# Patient Record
Sex: FEMALE | Race: AMERICAN INDIAN OR ALASKA NATIVE | NOT HISPANIC OR LATINO | Employment: OTHER | URBAN - METROPOLITAN AREA
[De-identification: names, ages, dates, MRNs, and addresses within clinical notes are randomized per-mention and may not be internally consistent; named-entity substitution may affect disease eponyms.]

---

## 2018-07-20 ENCOUNTER — OFFICE VISIT (OUTPATIENT)
Dept: ENDOCRINOLOGY | Facility: MEDICAL CENTER | Age: 78
End: 2018-07-20
Payer: MEDICARE

## 2018-07-20 VITALS
OXYGEN SATURATION: 91 % | HEIGHT: 66 IN | SYSTOLIC BLOOD PRESSURE: 153 MMHG | DIASTOLIC BLOOD PRESSURE: 70 MMHG | HEART RATE: 92 BPM | WEIGHT: 190 LBS | BODY MASS INDEX: 30.53 KG/M2

## 2018-07-20 DIAGNOSIS — E11.00 UNCONTROLLED TYPE 2 DIABETES MELLITUS WITH HYPEROSMOLARITY WITHOUT COMA, WITHOUT LONG-TERM CURRENT USE OF INSULIN (HCC): ICD-10-CM

## 2018-07-20 PROCEDURE — 99203 OFFICE O/P NEW LOW 30 MIN: CPT | Performed by: PHYSICIAN ASSISTANT

## 2018-07-20 RX ORDER — EMPAGLIFLOZIN 25 MG/1
1 TABLET, FILM COATED ORAL DAILY
Qty: 30 TAB | Refills: 11 | Status: SHIPPED | OUTPATIENT
Start: 2018-07-20 | End: 2021-10-21

## 2018-07-20 NOTE — PROGRESS NOTES
New Patient Consult Note  Referred by: NORMAN Johnson    Reason for consult: Diabetes Management Type 2    HPI:  Sarah Marie is a 78 y.o. old patient who is seeing us today for diabetes care.  This is a pleasant patient with diabetes and I appreciate the opportunity to participate in the care of this patient.  This is a new patient with me today.    Labs of 7/20/18     BG Diary:7/20/2018  In the AM:     Has been Diabetic since  Has a Glucagon pen at home: no    Right  stroke  Blind in the left eye    1. Uncontrolled type 2 diabetes mellitus with hyperosmolarity without coma, without long-term current use of insulin (HCC)  This is a new patient with me on 7/20/18  She is on   1.  Jardiance 25mg one a day      ROS:   Constitutional: No change in weight , No fatigue, No night sweats.  HEENT: No Headache.  Eyes:  No blurred vision, No visual changes.  Cardiac: No chest pain, No palpitations.  Resp: No shortness of breath, No cough,   Gastro: No nausea or vomiting, No diarrhea.  Neuro: Denies numbness or tinging in bilateral feet or hands, and no loss of sensation.  Endo: No heat or cold intolerance.  : No polyuria, No polydipsia, No chronic UTI's.  Lower extremities: No lower leg edema bilateral.  All other systems were reviewed and were negative.    Past Medical History:  Patient Active Problem List    Diagnosis Date Noted   • Diabetes mellitus type II, uncontrolled (HCC) 07/20/2018   • Chest pain 07/12/2016   • Acquired deflected nasal septum 12/10/2015       Past Surgical History:  Past Surgical History:   Procedure Laterality Date   • SEPTOPLASTY  12/10/2015    Procedure: SEPTOPLASTY;  Surgeon: Navjot Calhoun M.D.;  Location: SURGERY SAME DAY [facility];  Service:    • NASAL ENDOSCOPY Bilateral 12/10/2015    Procedure: NASAL ENDOSCOPY FOR CONTROL NASAL HEMORRHAGE;  Surgeon: Navjot Calhoun M.D.;  Location: SURGERY SAME DAY [facility];  Service:    • OTHER ORTHOPEDIC SURGERY  1997    BACK SURGERY   •  GYN SURGERY      1988 hysterectomy   • OTHER      luke iol (cataract)   • OTHER      gall stones removed   • OTHER ABDOMINAL SURGERY         Allergies:  Clindamycin; Codeine; Diclofenac; Iodine; Metformin; Omnicef [cefdinir]; Oxycodone; and Pcn [penicillins]    Social History:  Social History     Social History   • Marital status:      Spouse name: N/A   • Number of children: N/A   • Years of education: N/A     Occupational History   • Not on file.     Social History Main Topics   • Smoking status: Former Smoker   • Smokeless tobacco: Never Used   • Alcohol use No   • Drug use: No   • Sexual activity: Not on file     Other Topics Concern   • Not on file     Social History Narrative   • No narrative on file       Family History:  History reviewed. No pertinent family history.    Medications:    Current Outpatient Prescriptions:   •  Empagliflozin (JARDIANCE) 25 MG Tab, Take  by mouth., Disp: , Rfl:   •  JARDIANCE 25 MG Tab, Take 1 tablet by mouth every day., Disp: 30 Tab, Rfl: 11  •  cetirizine (ZYRTEC) 10 MG Tab, Take 10 mg by mouth every bedtime., Disp: , Rfl:   •  estrogens conjugated, synthetic A, (CENESTIN) 1.25 MG tablet, Take 1.25 mg by mouth every day., Disp: , Rfl:   •  lovastatin (MEVACOR) 40 MG tablet, Take 40 mg by mouth every evening., Disp: , Rfl:   •  Omega 3 1000 MG Cap, Take 1,000 mg by mouth every day., Disp: , Rfl:   •  omeprazole (PRILOSEC) 40 MG delayed-release capsule, Take 40 mg by mouth every day., Disp: , Rfl:   •  Cholecalciferol (VITAMIN D3) 2000 UNIT Cap, Take 2,000 Units by mouth every day., Disp: , Rfl:   •  dorzolamide (TRUSOPT) 2 % Solution, Place 1 Drop in both eyes 2 Times a Day., Disp: , Rfl:   •  latanoprost (XALATAN) 0.005 % Solution, Place 1 Drop in both eyes every evening., Disp: , Rfl:   •  carvedilol (COREG) 6.25 MG TABS, Take 1 Each by mouth every day at 6 PM., Disp: , Rfl:   •  lisinopril (PRINIVIL, ZESTRIL) 40 MG tablet, Take 1 Tab by mouth every day., Disp: , Rfl:  "      Physical Examination:   Vital signs: /70   Pulse 92   Ht 1.676 m (5' 6\")   Wt 86.2 kg (190 lb)   SpO2 91%   BMI 30.67 kg/m²   General: No distress, cooperative, well dressed and well nourished.   Eyes: No scleral icterus or discharge, No hyposphagma  ENMT: Normal on external inspection of nose, lips, No nasal drainage   Neck: No abnormal masses on inspection  Resp: Normal effort, Bilateral clear to auscultation, No wheezing, No rales  CVS: Regular rate and rhythm, S1 S2 normal, No murmur. No gallop  Extremities: No edema bilateral extremities  Neuro: Alert and oriented  Skin: No rash, No Ulcers  Psych: Normal mood and affect      Assessment and Plan:    1. Uncontrolled type 2 diabetes mellitus with hyperosmolarity without coma, without long-term current use of insulin (HCC)     She is on   1.  Jardiance 25mg one a day      Return in about 1 year (around 7/20/2019).    Blood glucose log: Check BG in the morning when wake up, before lunch or dinner and before bed.  So three times a day.  Always bring BG diary to the next office visit.     Thank you kindly for allowing me to participate in the diabetes care plan for this patient.    Yousif Anaya PA-C, BC-ADM  Board Certified - [employer] Diabetes [facility]  07/20/18    CC:   TALISHA Johnson.    "

## 2018-08-01 ENCOUNTER — HOSPITAL ENCOUNTER (EMERGENCY)
Facility: MEDICAL CENTER | Age: 78
End: 2018-08-01
Attending: EMERGENCY MEDICINE
Payer: MEDICARE

## 2018-08-01 VITALS
BODY MASS INDEX: 30.53 KG/M2 | HEIGHT: 66 IN | SYSTOLIC BLOOD PRESSURE: 157 MMHG | HEART RATE: 86 BPM | OXYGEN SATURATION: 98 % | TEMPERATURE: 97.9 F | DIASTOLIC BLOOD PRESSURE: 84 MMHG | WEIGHT: 190 LBS | RESPIRATION RATE: 18 BRPM

## 2018-08-01 DIAGNOSIS — T50.905A MEDICATION REACTION, INITIAL ENCOUNTER: ICD-10-CM

## 2018-08-01 DIAGNOSIS — R11.2 NON-INTRACTABLE VOMITING WITH NAUSEA, UNSPECIFIED VOMITING TYPE: ICD-10-CM

## 2018-08-01 PROCEDURE — 99284 EMERGENCY DEPT VISIT MOD MDM: CPT

## 2018-08-01 PROCEDURE — 700111 HCHG RX REV CODE 636 W/ 250 OVERRIDE (IP): Performed by: EMERGENCY MEDICINE

## 2018-08-01 PROCEDURE — 700102 HCHG RX REV CODE 250 W/ 637 OVERRIDE(OP): Performed by: EMERGENCY MEDICINE

## 2018-08-01 PROCEDURE — A9270 NON-COVERED ITEM OR SERVICE: HCPCS | Performed by: EMERGENCY MEDICINE

## 2018-08-01 RX ORDER — ONDANSETRON 4 MG/1
4 TABLET, ORALLY DISINTEGRATING ORAL ONCE
Status: COMPLETED | OUTPATIENT
Start: 2018-08-01 | End: 2018-08-01

## 2018-08-01 RX ORDER — ONDANSETRON 4 MG/1
4 TABLET, ORALLY DISINTEGRATING ORAL EVERY 8 HOURS PRN
Qty: 15 TAB | Refills: 0 | Status: SHIPPED | OUTPATIENT
Start: 2018-08-01 | End: 2021-10-21

## 2018-08-01 RX ADMIN — ONDANSETRON 4 MG: 4 TABLET, ORALLY DISINTEGRATING ORAL at 17:57

## 2018-08-01 RX ADMIN — LIDOCAINE HYDROCHLORIDE 30 ML: 20 SOLUTION OROPHARYNGEAL at 17:57

## 2018-08-01 ASSESSMENT — PAIN SCALES - GENERAL
PAINLEVEL_OUTOF10: 8
PAINLEVEL_OUTOF10: 0

## 2018-08-01 NOTE — ED TRIAGE NOTES
Patient reports nausea and 1 episode of vomiting after breakfast. Alert, oriented. Patient states she took her norco without eating anything this AM. Generalized abdominal pain.

## 2018-08-02 ENCOUNTER — PATIENT OUTREACH (OUTPATIENT)
Dept: HEALTH INFORMATION MANAGEMENT | Facility: OTHER | Age: 78
End: 2018-08-02

## 2018-08-02 NOTE — ED PROVIDER NOTES
ED Provider Note    Scribed for Sly Paz M.D. by Talon Burgess. 8/1/2018  5:35 PM    Primary care provider: NORMAN Johnson  Means of arrival: walk in  History obtained from: Patient  History limited by: None    CHIEF COMPLAINT  Chief Complaint   Patient presents with   • Nausea       HPI  Sarah Marie is a 78 y.o. female who presents to the Emergency Department complaining of gradually worsening nausea starting this morning. Daughter reports that the patient is partially blind in her right eye. She states that the patient filled a prescription yesterday for 30 tabs of hydrocodone, but her daughter noted that there was only 20 tabs in the bottle today. Patient has a history of diabetes. She denies vomiting, weakness, numbness, leg swelling, history of ulcer.     REVIEW OF SYSTEMS  Pertinent negatives include no vomiting, weakness, numbness, leg swelling. As above, all other systems reviewed and are negative.   See HPI for further details.   C.    PAST MEDICAL HISTORY   has a past medical history of Angina; Arthritis; Backpain; CATARACT; Diabetes; Diverticulosis; Hemorrhagic disorder (HCC); High cholesterol; Hypertension; Stroke (HCC); and Unspecified urinary incontinence.    SURGICAL HISTORY   has a past surgical history that includes other abdominal surgery; gyn surgery; other; other; other orthopedic surgery (1997); septoplasty (12/10/2015); and nasal endoscopy (Bilateral, 12/10/2015).    SOCIAL HISTORY  Social History   Substance Use Topics   • Smoking status: Former Smoker   • Smokeless tobacco: Never Used   • Alcohol use No      History   Drug Use No       FAMILY HISTORY  None noted    CURRENT MEDICATIONS  Current Outpatient Prescriptions:   •  Empagliflozin (JARDIANCE) 25 MG Tab, Take  by mouth., Disp: , Rfl:   •  JARDIANCE 25 MG Tab, Take 1 tablet by mouth every day., Disp: 30 Tab, Rfl: 11  •  cetirizine (ZYRTEC) 10 MG Tab, Take 10 mg by mouth every bedtime., Disp: , Rfl:   •  estrogens  "conjugated, synthetic A, (CENESTIN) 1.25 MG tablet, Take 1.25 mg by mouth every day., Disp: , Rfl:   •  lovastatin (MEVACOR) 40 MG tablet, Take 40 mg by mouth every evening., Disp: , Rfl:   •  Omega 3 1000 MG Cap, Take 1,000 mg by mouth every day., Disp: , Rfl:   •  omeprazole (PRILOSEC) 40 MG delayed-release capsule, Take 40 mg by mouth every day., Disp: , Rfl:   •  Cholecalciferol (VITAMIN D3) 2000 UNIT Cap, Take 2,000 Units by mouth every day., Disp: , Rfl:   •  dorzolamide (TRUSOPT) 2 % Solution, Place 1 Drop in both eyes 2 Times a Day., Disp: , Rfl:   •  latanoprost (XALATAN) 0.005 % Solution, Place 1 Drop in both eyes every evening., Disp: , Rfl:   •  carvedilol (COREG) 6.25 MG TABS, Take 1 Each by mouth every day at 6 PM., Disp: , Rfl:   •  lisinopril (PRINIVIL, ZESTRIL) 40 MG tablet, Take 1 Tab by mouth every day., Disp: , Rfl:     ALLERGIES  Allergies   Allergen Reactions   • Clindamycin Rash         • Codeine Vomiting   • Diclofenac      Gi problems     • Iodine Unspecified     Skin irritation     • Metformin      dairrhea   • Omnicef [Cefdinir] Rash     rash   • Oxycodone Vomiting   • Pcn [Penicillins] Rash     As  A child         PHYSICAL EXAM  VITAL SIGNS: BP (!) 166/82   Pulse 88   Temp 36.6 °C (97.9 °F)   Resp 18   Ht 1.676 m (5' 6\")   Wt 86.2 kg (190 lb)   SpO2 96%   BMI 30.67 kg/m²     Constitutional: Well developed, Well nourished, No acute distress, Non-toxic appearance.   HENT: Normocephalic, Atraumatic, Bilateral external ears normal, Oropharynx is clear mucous membranes are moist. No oral exudates or nasal discharge.   Eyes: Pupils are equal round and reactive, EOMI, Conjunctiva normal, No discharge.   Neck: Normal range of motion, No tenderness, Supple, No stridor. No meningismus.  Lymphatic: No lymphadenopathy noted.   Cardiovascular: Regular rate and rhythm without murmur rub or gallop.  Thorax & Lungs: Clear breath sounds bilaterally without wheezes, rhonchi or rales. There is no " chest wall tenderness.   Abdomen: Soft non-tender non-distended. There is no rebound or guarding. No organomegaly is appreciated. Bowel sounds are normal.  Skin: Normal without rash.   Back: No CVA or spinal tenderness.   Extremities: Intact distal pulses, No edema, No tenderness, No cyanosis, No clubbing. Capillary refill is less than 2 seconds.  Musculoskeletal: Good range of motion in all major joints. No tenderness to palpation or major deformities noted.   Neurologic: Alert & oriented x 3, Normal motor function, Normal sensory function, No focal deficits noted. Reflexes are normal.  Psychiatric: Affect normal, Judgment normal, Mood normal. There is no suicidal ideation or patient reported hallucinations.     DIAGNOSTIC STUDIES / PROCEDURES    COURSE & MEDICAL DECISION MAKING  Nursing notes, VS, PMSFHx reviewed in chart.    5:35 PM Patient seen and examined at bedside. She has good baseline mentation at this time. She will well outside of the effective dose range at the reported last dose time 10 hours ago. Discussed treatement with anti nausea medication and reevaluation for discharge. Patient and her daughter verbalizes understanding and agreement to this plan of care. Patient was treated with Zofran 4 mg, GI cocktail 30 ml for her symptoms.      I doubt the patient needs any imaging or lab work at this time.  She has had a medication reaction to hydrocodone and this will be listed as an allergy in the future.    5:56 PM - Recheck: Patient re-evaluated at beside. Patient reports feeling greatly improved with interventions. Discussed patient's condition and treatment plan. Patient will be discharged with instructions and provided with strict return precautions. She will be prescribed Zofran for discharge. Advised to follow up with her primary. Instructed to return to Emergency Department immediately if any new or worsening symptoms, specifically if she develops vomiting, hematemesis.     Discharge vitals: BP  "(!) 166/82   Pulse 88   Temp 36.6 °C (97.9 °F)   Resp 18   Ht 1.676 m (5' 6\")   Wt 86.2 kg (190 lb)   SpO2 96%   BMI 30.67 kg/m²     Patient has had high blood pressure while in the emergency department, felt likely secondary to medical condition. Counseled patient to monitor blood pressure at home and follow up with primary care physician.    The patient will return for new or worsening symptoms and is stable at the time of discharge.    DISPOSITION:  Patient will be discharged home in stable condition.    FINAL IMPRESSION  1. Non-intractable vomiting with nausea, unspecified vomiting type    2. Medication reaction, initial encounter          Talon CHEATHAM (Scribe), am scribing for, and in the presence of, Sly Paz M.D..    Electronically signed by: Talon Burgess (Scribe), 8/1/2018    ISly M.D. personally performed the services described in this documentation, as scribed by Talon Burgess in my presence, and it is both accurate and complete.    The note accurately reflects work and decisions made by me.  Sly Paz  8/1/2018  6:13 PM      "

## 2018-08-02 NOTE — ED NOTES
Patient provided discharge instructions, received 1 rx. Instructed to follow up with PCP. Verbalized understanding, no questions at this time. Safe for discharge, ambulates out of ED.

## 2018-08-02 NOTE — DISCHARGE INSTRUCTIONS
Polypharmacy Problems  I think you had a bad reaction to hydrocodone and I would list this as an allergy in the future  Polypharmacy problems mean that certain medicines can cause problems when you take them together. Some medicine problems can be life-threatening. Your doctors need to know about all the medicines you take. This includes vitamins, herbs, eyedrops, over-the-counter medicines, prescribed medicines, and creams.  HOME CARE  · Pick one doctor to be in charge of your medicines. Tell this doctor about all the medicines you take, even if they are prescribed by another doctor.  · Buy all your medicines at the same pharmacy. The pharmacy keeps track of your medicines. They can tell your doctor if there are possible problems with your medicines.  · Read the instructions you get with your medicines.  · Keep a list of all your medicines and how much you need to take (doses) with you.  · Use a system to keep track of your medicines. You can use a pillbox to sort your medicines by the day and time you need to take them.  · Keep a list of your medical problems with you.  · Ask your doctor or pharmacy if you have any questions about your medicines.  GET HELP RIGHT AWAY IF:   · You have any problems that may be caused by your medicines.  · You have chest pain.  · You are short of breath.  · You have a pulse that is not normal.  · You pass out (faint).  · You are shaking (tremors).  · You feel weak or tired (lethargic).  · You have a rash or puffiness (swelling) in any part of your body.  · You have more bleeding, or bleeding from the butt (rectum) or vagina.  · You bruise easily.  · You have belly (abdominal) pain.  · You feel sick to your stomach (nauseous).  · You throw up (vomit).  MAKE SURE YOU:  · Understand these instructions.  · Will watch your condition.  · Will get help right away if you are not doing well or get worse.     This information is not intended to replace advice given to you by your health care  provider. Make sure you discuss any questions you have with your health care provider.     Document Released: 03/14/2011 Document Revised: 03/11/2013 Document Reviewed: 09/06/2012  Elsevier Interactive Patient Education ©2016 [employer]

## 2018-10-19 ENCOUNTER — HOSPITAL ENCOUNTER (OUTPATIENT)
Dept: RADIOLOGY | Facility: MEDICAL CENTER | Age: 78
End: 2018-10-19
Attending: NURSE PRACTITIONER
Payer: MEDICARE

## 2018-10-19 DIAGNOSIS — M81.0 AGE-RELATED OSTEOPOROSIS WITHOUT CURRENT PATHOLOGICAL FRACTURE: ICD-10-CM

## 2018-10-19 PROCEDURE — 77080 DXA BONE DENSITY AXIAL: CPT

## 2019-07-17 ENCOUNTER — HOSPITAL ENCOUNTER (EMERGENCY)
Facility: MEDICAL CENTER | Age: 79
End: 2019-07-17
Payer: MEDICARE

## 2019-07-17 VITALS
BODY MASS INDEX: 30.05 KG/M2 | HEART RATE: 75 BPM | TEMPERATURE: 96.6 F | SYSTOLIC BLOOD PRESSURE: 137 MMHG | RESPIRATION RATE: 16 BRPM | DIASTOLIC BLOOD PRESSURE: 73 MMHG | OXYGEN SATURATION: 93 % | HEIGHT: 66 IN | WEIGHT: 187 LBS

## 2019-07-17 LAB
ALBUMIN SERPL BCP-MCNC: 4 G/DL (ref 3.2–4.9)
ALBUMIN/GLOB SERPL: 1.3 G/DL
ALP SERPL-CCNC: 72 U/L (ref 30–99)
ALT SERPL-CCNC: 9 U/L (ref 2–50)
ANION GAP SERPL CALC-SCNC: 7 MMOL/L (ref 0–11.9)
AST SERPL-CCNC: 13 U/L (ref 12–45)
BASOPHILS # BLD AUTO: 0.9 % (ref 0–1.8)
BASOPHILS # BLD: 0.06 K/UL (ref 0–0.12)
BILIRUB SERPL-MCNC: 0.3 MG/DL (ref 0.1–1.5)
BUN SERPL-MCNC: 10 MG/DL (ref 8–22)
CALCIUM SERPL-MCNC: 9.1 MG/DL (ref 8.5–10.5)
CHLORIDE SERPL-SCNC: 101 MMOL/L (ref 96–112)
CO2 SERPL-SCNC: 27 MMOL/L (ref 20–33)
CREAT SERPL-MCNC: 0.63 MG/DL (ref 0.5–1.4)
EKG IMPRESSION: NORMAL
EOSINOPHIL # BLD AUTO: 0.17 K/UL (ref 0–0.51)
EOSINOPHIL NFR BLD: 2.7 % (ref 0–6.9)
ERYTHROCYTE [DISTWIDTH] IN BLOOD BY AUTOMATED COUNT: 44.2 FL (ref 35.9–50)
GLOBULIN SER CALC-MCNC: 3.1 G/DL (ref 1.9–3.5)
GLUCOSE SERPL-MCNC: 116 MG/DL (ref 65–99)
HCT VFR BLD AUTO: 46.4 % (ref 37–47)
HGB BLD-MCNC: 14.8 G/DL (ref 12–16)
IMM GRANULOCYTES # BLD AUTO: 0.01 K/UL (ref 0–0.11)
IMM GRANULOCYTES NFR BLD AUTO: 0.2 % (ref 0–0.9)
LYMPHOCYTES # BLD AUTO: 2.83 K/UL (ref 1–4.8)
LYMPHOCYTES NFR BLD: 44.5 % (ref 22–41)
MCH RBC QN AUTO: 29.5 PG (ref 27–33)
MCHC RBC AUTO-ENTMCNC: 31.9 G/DL (ref 33.6–35)
MCV RBC AUTO: 92.4 FL (ref 81.4–97.8)
MONOCYTES # BLD AUTO: 0.51 K/UL (ref 0–0.85)
MONOCYTES NFR BLD AUTO: 8 % (ref 0–13.4)
NEUTROPHILS # BLD AUTO: 2.78 K/UL (ref 2–7.15)
NEUTROPHILS NFR BLD: 43.7 % (ref 44–72)
NRBC # BLD AUTO: 0 K/UL
NRBC BLD-RTO: 0 /100 WBC
PLATELET # BLD AUTO: 227 K/UL (ref 164–446)
PMV BLD AUTO: 9.8 FL (ref 9–12.9)
POTASSIUM SERPL-SCNC: 4.1 MMOL/L (ref 3.6–5.5)
PROT SERPL-MCNC: 7.1 G/DL (ref 6–8.2)
RBC # BLD AUTO: 5.02 M/UL (ref 4.2–5.4)
SODIUM SERPL-SCNC: 135 MMOL/L (ref 135–145)
TROPONIN T SERPL-MCNC: 7 NG/L (ref 6–19)
WBC # BLD AUTO: 6.4 K/UL (ref 4.8–10.8)

## 2019-07-17 PROCEDURE — 93005 ELECTROCARDIOGRAM TRACING: CPT

## 2019-07-17 PROCEDURE — 85025 COMPLETE CBC W/AUTO DIFF WBC: CPT

## 2019-07-17 PROCEDURE — 302449 STATCHG TRIAGE ONLY (STATISTIC)

## 2019-07-17 PROCEDURE — 80053 COMPREHEN METABOLIC PANEL: CPT

## 2019-07-17 PROCEDURE — 84484 ASSAY OF TROPONIN QUANT: CPT

## 2019-07-17 NOTE — ED TRIAGE NOTES
"[facility], pt was being seen for nausea today and when they took her BP they had a systolic in the low 100's, pt became worried that her BP was too low. PT requested to be transported to the ER. PT arrives and reports that she has nausea that started this AM, while triaging pt she then reported \"chest pressure, like a hand is pushing on me\".  EKG called for triage protocol ordered   Chief Complaint   Patient presents with   • Nausea     x 1 day   • Chest Pressure     /73   Pulse 75   Temp 35.9 °C (96.6 °F) (Temporal)   Resp 16   Ht 1.676 m (5' 6\")   Wt 84.8 kg (187 lb)   SpO2 93%     "

## 2019-09-23 NOTE — TELEPHONE ENCOUNTER
**Last office visit 7/20/2018**    Was the patient seen in the last year in this department? No     Does patient have an active prescription for medications requested? Yes    Received Request Via: Pharmacy    Day supply: 30

## 2020-03-06 ENCOUNTER — HOSPITAL ENCOUNTER (OUTPATIENT)
Dept: RADIOLOGY | Facility: MEDICAL CENTER | Age: 80
End: 2020-03-06
Attending: NURSE PRACTITIONER
Payer: MEDICARE

## 2020-03-06 DIAGNOSIS — G89.29 CHRONIC RIGHT-SIDED LOW BACK PAIN WITHOUT SCIATICA: ICD-10-CM

## 2020-03-06 DIAGNOSIS — M54.50 CHRONIC RIGHT-SIDED LOW BACK PAIN WITHOUT SCIATICA: ICD-10-CM

## 2020-03-06 PROCEDURE — 72148 MRI LUMBAR SPINE W/O DYE: CPT

## 2021-01-13 DIAGNOSIS — Z23 NEED FOR VACCINATION: ICD-10-CM

## 2021-10-18 ENCOUNTER — TELEPHONE (OUTPATIENT)
Dept: CARDIOLOGY | Facility: MEDICAL CENTER | Age: 81
End: 2021-10-18

## 2021-10-18 NOTE — TELEPHONE ENCOUNTER
Called patient in regards to obtaining records for NP appointment with [facility]. Patient's daughter stated she believes pt had seen a cardiologist once in the 90's but no cardiologist since then. Pt's daughter stated pt has had cardiac imaging and testing done at [facility]. Requested records from [facility] at fax # [phone]. Fax confirmation received. Records pending. Confirmed all other recent notes, labs, and cardiac imaging are in Epic. Confirmed with patient appointment date, time, and location.

## 2021-10-21 ENCOUNTER — OFFICE VISIT (OUTPATIENT)
Dept: CARDIOLOGY | Facility: MEDICAL CENTER | Age: 81
End: 2021-10-21

## 2021-10-21 VITALS
RESPIRATION RATE: 12 BRPM | DIASTOLIC BLOOD PRESSURE: 68 MMHG | OXYGEN SATURATION: 98 % | WEIGHT: 157 LBS | SYSTOLIC BLOOD PRESSURE: 126 MMHG | HEIGHT: 66 IN | BODY MASS INDEX: 25.23 KG/M2 | HEART RATE: 100 BPM

## 2021-10-21 DIAGNOSIS — E11.65 UNCONTROLLED TYPE 2 DIABETES MELLITUS WITH HYPERGLYCEMIA (HCC): ICD-10-CM

## 2021-10-21 DIAGNOSIS — R07.9 CHEST PAIN, UNSPECIFIED TYPE: ICD-10-CM

## 2021-10-21 DIAGNOSIS — E78.49 OTHER HYPERLIPIDEMIA: ICD-10-CM

## 2021-10-21 DIAGNOSIS — I10 PRIMARY HYPERTENSION: ICD-10-CM

## 2021-10-21 DIAGNOSIS — K21.9 GASTROESOPHAGEAL REFLUX DISEASE WITHOUT ESOPHAGITIS: ICD-10-CM

## 2021-10-21 DIAGNOSIS — E55.9 VITAMIN D DEFICIENCY: ICD-10-CM

## 2021-10-21 PROCEDURE — 93000 ELECTROCARDIOGRAM COMPLETE: CPT | Performed by: INTERNAL MEDICINE

## 2021-10-21 PROCEDURE — 99203 OFFICE O/P NEW LOW 30 MIN: CPT | Performed by: NURSE PRACTITIONER

## 2021-10-21 RX ORDER — METOPROLOL SUCCINATE 25 MG/1
25 TABLET, EXTENDED RELEASE ORAL DAILY
Qty: 90 TABLET | Refills: 3 | Status: SHIPPED | OUTPATIENT
Start: 2021-10-21 | End: 2022-07-28

## 2021-10-21 RX ORDER — CARVEDILOL 3.12 MG/1
TABLET ORAL
COMMUNITY
Start: 2021-10-11 | End: 2021-10-21

## 2021-10-21 RX ORDER — METOPROLOL SUCCINATE 25 MG/1
25 TABLET, EXTENDED RELEASE ORAL DAILY
Qty: 90 TABLET | Refills: 3 | Status: SHIPPED | OUTPATIENT
Start: 2021-10-21 | End: 2021-10-21 | Stop reason: SDUPTHER

## 2021-10-21 RX ORDER — ALENDRONATE SODIUM 70 MG/1
70 TABLET ORAL
COMMUNITY

## 2021-10-21 RX ORDER — SIMVASTATIN 5 MG
5 TABLET ORAL DAILY
COMMUNITY

## 2021-10-21 ASSESSMENT — ENCOUNTER SYMPTOMS
CLAUDICATION: 0
FEVER: 0
ABDOMINAL PAIN: 0
MYALGIAS: 0
PND: 0
COUGH: 0
SHORTNESS OF BREATH: 0
DIZZINESS: 0
ORTHOPNEA: 0
PALPITATIONS: 0

## 2021-10-21 NOTE — PROGRESS NOTES
Chief Complaint   Patient presents with   • Chest Pain       Subjective     Sarah Marie is a 81 y.o. female who presents today for new patient consultation for abnormal EKG in the past.    She is a patient of the [place] [facility] and was referred for abnormal EKG and needing to be on bb therapy, previously prescribed by cardiology in the past.    Hx of legally blind from macular degeneration, HTN, HLD, DM type II, and CVA in '04 (no residual deficits).    She presents today with her daughter, who works at the [place] [facility] at [employer].    She is in a wheelchair for debility. She is legally blind. Her daughter helps with her medications every morning. She now finally has her blood sugar under control.    She is resistant to move in with her daughter, she doesn't want to move and lose her independence although she has memory loss and is often found wandering around [facility].    She has no chest pain, shortness of breath, edema, dizziness/lightheadedness, or palpitations.    Past Medical History:   Diagnosis Date   • Angina     since  1970's   • Arthritis    • Backpain     L4 and L5   • CATARACT    • Diabetes     oral medication   • Diverticulosis    • Hemorrhagic disorder (HCC)     nose   • High cholesterol    • Hypertension    • Stroke (HCC)     2004 left sided   • Unspecified urinary incontinence      Past Surgical History:   Procedure Laterality Date   • SEPTOPLASTY  12/10/2015    Procedure: SEPTOPLASTY;  Surgeon: Navjot Calhoun M.D.;  Location: SURGERY SAME DAY [facility];  Service:    • NASAL ENDOSCOPY Bilateral 12/10/2015    Procedure: NASAL ENDOSCOPY FOR CONTROL NASAL HEMORRHAGE;  Surgeon: Navjot Calhoun M.D.;  Location: SURGERY SAME DAY [facility];  Service:    • OTHER ORTHOPEDIC SURGERY  1997    BACK SURGERY   • GYN SURGERY      1988 hysterectomy   • OTHER      luke iol (cataract)   • OTHER      gall stones removed   • OTHER ABDOMINAL SURGERY       History reviewed. No pertinent family  history.  Social History     Socioeconomic History   • Marital status:      Spouse name: Not on file   • Number of children: Not on file   • Years of education: Not on file   • Highest education level: Not on file   Occupational History   • Not on file   Tobacco Use   • Smoking status: Former Smoker   • Smokeless tobacco: Never Used   Substance and Sexual Activity   • Alcohol use: No   • Drug use: No   • Sexual activity: Not on file   Other Topics Concern   • Not on file   Social History Narrative   • Not on file     Social Determinants of Health     Financial Resource Strain:    • Difficulty of Paying Living Expenses:    Food Insecurity:    • Worried About Running Out of Food in the Last Year:    • Ran Out of Food in the Last Year:    Transportation Needs:    • Lack of Transportation (Medical):    • Lack of Transportation (Non-Medical):    Physical Activity:    • Days of Exercise per Week:    • Minutes of Exercise per Session:    Stress:    • Feeling of Stress :    Social Connections:    • Frequency of Communication with Friends and Family:    • Frequency of Social Gatherings with Friends and Family:    • Attends [employer] Services:    • Active Member of Clubs or Organizations:    • Attends Club or Organization Meetings:    • Marital Status:    Intimate Partner Violence:    • Fear of Current or Ex-Partner:    • Emotionally Abused:    • Physically Abused:    • Sexually Abused:      Allergies   Allergen Reactions   • Clindamycin Rash         • Codeine Vomiting   • Diclofenac      Gi problems     • Hydrocodone Nausea   • Iodine Unspecified     Skin irritation     • Metformin      dairrhea   • Omnicef [Cefdinir] Rash     rash   • Oxycodone Vomiting   • Pcn [Penicillins] Rash     As  A child       Outpatient Encounter Medications as of 10/21/2021   Medication Sig Dispense Refill   • alendronate (FOSAMAX) 70 MG Tab Take 70 mg by mouth every day.     • simvastatin (ZOCOR) 5 MG Tab Take 5 mg by mouth every evening.      • metoprolol SR (TOPROL XL) 25 MG TABLET SR 24 HR Take 1 Tablet by mouth every day. 90 Tablet 3   • Empagliflozin (JARDIANCE) 25 MG Tab Take 1 Tab by mouth every day. Please make an appointment for further refills. 30 Tab 1   • estrogens conjugated, synthetic A, (CENESTIN) 1.25 MG tablet Take 1.25 mg by mouth every day.     • Cholecalciferol (VITAMIN D3) 2000 UNIT Cap Take 2,000 Units by mouth every day.     • dorzolamide (TRUSOPT) 2 % Solution Place 1 Drop in both eyes 2 Times a Day.     • latanoprost (XALATAN) 0.005 % Solution Place 1 Drop in both eyes every evening.     • lisinopril (PRINIVIL, ZESTRIL) 40 MG tablet Take 1 Tab by mouth every day.     • [DISCONTINUED] carvedilol (COREG) 3.125 MG Tab  (Patient not taking: Reported on 10/21/2021)     • [DISCONTINUED] metoprolol SR (TOPROL XL) 25 MG TABLET SR 24 HR Take 1 Tablet by mouth every day. 90 Tablet 3   • [DISCONTINUED] ondansetron (ZOFRAN ODT) 4 MG TABLET DISPERSIBLE Take 1 Tab by mouth every 8 hours as needed for up to 15 doses. (Patient not taking: Reported on 10/21/2021) 15 Tab 0   • [DISCONTINUED] JARDIANCE 25 MG Tab Take 1 tablet by mouth every day. 30 Tab 11   • [DISCONTINUED] cetirizine (ZYRTEC) 10 MG Tab Take 10 mg by mouth every bedtime. (Patient not taking: Reported on 10/21/2021)     • [DISCONTINUED] lovastatin (MEVACOR) 40 MG tablet Take 40 mg by mouth every evening. (Patient not taking: Reported on 10/21/2021)     • [DISCONTINUED] Omega 3 1000 MG Cap Take 1,000 mg by mouth every day. (Patient not taking: Reported on 10/21/2021)     • [DISCONTINUED] omeprazole (PRILOSEC) 40 MG delayed-release capsule Take 40 mg by mouth every day. (Patient not taking: Reported on 10/21/2021)     • [DISCONTINUED] carvedilol (COREG) 6.25 MG TABS Take 1 Each by mouth 2 times a day with meals.       No facility-administered encounter medications on file as of 10/21/2021.     Review of Systems   Constitutional: Negative for fever and malaise/fatigue.        In  "wheelchair   Eyes:        Blind   Respiratory: Negative for cough and shortness of breath.    Cardiovascular: Negative for chest pain, palpitations, orthopnea, claudication, leg swelling and PND.   Gastrointestinal: Negative for abdominal pain.   Musculoskeletal: Negative for myalgias.   Neurological: Negative for dizziness.              Objective     /68 (BP Location: Left arm, Patient Position: Sitting, BP Cuff Size: Adult)   Pulse 100   Resp 12   Ht 1.676 m (5' 6\")   Wt 71.2 kg (157 lb)   SpO2 98%   BMI 25.34 kg/m²     Physical Exam  Vitals and nursing note reviewed.   Constitutional:       Appearance: Normal appearance. She is well-developed and normal weight. She is ill-appearing.   HENT:      Head: Normocephalic and atraumatic.   Neck:      Vascular: No JVD.   Cardiovascular:      Rate and Rhythm: Normal rate and regular rhythm.      Pulses: Normal pulses.      Heart sounds: Normal heart sounds.   Pulmonary:      Effort: Pulmonary effort is normal.      Breath sounds: Normal breath sounds.   Musculoskeletal:         General: Normal range of motion.   Skin:     General: Skin is warm and dry.      Capillary Refill: Capillary refill takes less than 2 seconds.   Neurological:      General: No focal deficit present.      Mental Status: She is alert and oriented to person, place, and time. Mental status is at baseline.   Psychiatric:         Mood and Affect: Mood normal.         Behavior: Behavior normal.         Thought Content: Thought content normal.         Judgment: Judgment normal.                Assessment & Plan     1. Chest pain, unspecified type  EKG   2. Uncontrolled type 2 diabetes mellitus with hyperglycemia (HCC)     3. Primary hypertension     4. Vitamin D deficiency     5. Other hyperlipidemia     6. Gastroesophageal reflux disease without esophagitis         Medical Decision Making: Today's Assessment/Status/Plan:      1. HTN  -change coreg to toprol 25 mg QD with inability to take " medications BID  -watch BP at home, BP goal <130/80  -follow with PCP  -no active cardiac symptoms, coronary angiogram in '04 showed normal coronaries    2. HLD  -statin  -LDL goal <100 with DM  -no known vascular disease on chart  -follow annual labs with PCP    3. DM type II  -now under improved control per PCP and daughter  -cont management with PCP    Patient is to follow up with our office PRN for any cardiac concerns, BP can be managed by PCP.

## 2021-10-21 NOTE — PATIENT INSTRUCTIONS
Refilled new medication metoprolol 25 mg once a day to replace carvedilol.    Keep track of BP and HR daily with this change. Report back to me some readings in a few weeks. BP goal <130/80 and HR <100 at rest.    We don't need to see her for BP management, as long as her PCP can manage BP medications.    Please call or come back to our office if anything changes

## 2021-10-22 LAB — EKG IMPRESSION: NORMAL

## 2022-07-27 ENCOUNTER — HOSPITAL ENCOUNTER (INPATIENT)
Facility: MEDICAL CENTER | Age: 82
LOS: 3 days | DRG: 189 | End: 2022-07-31
Attending: EMERGENCY MEDICINE | Admitting: STUDENT IN AN ORGANIZED HEALTH CARE EDUCATION/TRAINING PROGRAM
Payer: MEDICARE

## 2022-07-27 ENCOUNTER — APPOINTMENT (OUTPATIENT)
Dept: RADIOLOGY | Facility: MEDICAL CENTER | Age: 82
DRG: 189 | End: 2022-07-27
Attending: EMERGENCY MEDICINE
Payer: MEDICARE

## 2022-07-27 DIAGNOSIS — H54.3 BLINDNESS OF BOTH EYES: ICD-10-CM

## 2022-07-27 DIAGNOSIS — R53.1 WEAKNESS: ICD-10-CM

## 2022-07-27 DIAGNOSIS — R09.02 HYPOXIA: ICD-10-CM

## 2022-07-27 DIAGNOSIS — N30.00 ACUTE CYSTITIS WITHOUT HEMATURIA: ICD-10-CM

## 2022-07-27 DIAGNOSIS — E11.65 UNCONTROLLED TYPE 2 DIABETES MELLITUS WITH HYPERGLYCEMIA (HCC): ICD-10-CM

## 2022-07-27 DIAGNOSIS — I10 PRIMARY HYPERTENSION: ICD-10-CM

## 2022-07-27 DIAGNOSIS — J96.01 ACUTE RESPIRATORY FAILURE WITH HYPOXIA (HCC): ICD-10-CM

## 2022-07-27 LAB
ALBUMIN SERPL BCP-MCNC: 4.2 G/DL (ref 3.2–4.9)
ALBUMIN/GLOB SERPL: 1.5 G/DL
ALP SERPL-CCNC: 95 U/L (ref 30–99)
ALT SERPL-CCNC: 9 U/L (ref 2–50)
ANION GAP SERPL CALC-SCNC: 10 MMOL/L (ref 7–16)
AST SERPL-CCNC: 17 U/L (ref 12–45)
BASOPHILS # BLD AUTO: 0.8 % (ref 0–1.8)
BASOPHILS # BLD: 0.07 K/UL (ref 0–0.12)
BILIRUB SERPL-MCNC: 0.2 MG/DL (ref 0.1–1.5)
BUN SERPL-MCNC: 16 MG/DL (ref 8–22)
CALCIUM SERPL-MCNC: 8.9 MG/DL (ref 8.5–10.5)
CHLORIDE SERPL-SCNC: 103 MMOL/L (ref 96–112)
CO2 SERPL-SCNC: 23 MMOL/L (ref 20–33)
CREAT SERPL-MCNC: 0.49 MG/DL (ref 0.5–1.4)
D DIMER PPP IA.FEU-MCNC: 1.39 UG/ML (FEU) (ref 0–0.5)
EOSINOPHIL # BLD AUTO: 0.02 K/UL (ref 0–0.51)
EOSINOPHIL NFR BLD: 0.2 % (ref 0–6.9)
ERYTHROCYTE [DISTWIDTH] IN BLOOD BY AUTOMATED COUNT: 43.1 FL (ref 35.9–50)
FLUAV RNA SPEC QL NAA+PROBE: NEGATIVE
FLUBV RNA SPEC QL NAA+PROBE: NEGATIVE
GFR SERPLBLD CREATININE-BSD FMLA CKD-EPI: 93 ML/MIN/1.73 M 2
GLOBULIN SER CALC-MCNC: 2.8 G/DL (ref 1.9–3.5)
GLUCOSE SERPL-MCNC: 161 MG/DL (ref 65–99)
HCT VFR BLD AUTO: 41.4 % (ref 37–47)
HGB BLD-MCNC: 14 G/DL (ref 12–16)
IMM GRANULOCYTES # BLD AUTO: 0.04 K/UL (ref 0–0.11)
IMM GRANULOCYTES NFR BLD AUTO: 0.4 % (ref 0–0.9)
LYMPHOCYTES # BLD AUTO: 1.16 K/UL (ref 1–4.8)
LYMPHOCYTES NFR BLD: 12.6 % (ref 22–41)
MCH RBC QN AUTO: 30.9 PG (ref 27–33)
MCHC RBC AUTO-ENTMCNC: 33.8 G/DL (ref 33.6–35)
MCV RBC AUTO: 91.4 FL (ref 81.4–97.8)
MONOCYTES # BLD AUTO: 0.56 K/UL (ref 0–0.85)
MONOCYTES NFR BLD AUTO: 6.1 % (ref 0–13.4)
NEUTROPHILS # BLD AUTO: 7.39 K/UL (ref 2–7.15)
NEUTROPHILS NFR BLD: 79.9 % (ref 44–72)
NRBC # BLD AUTO: 0 K/UL
NRBC BLD-RTO: 0 /100 WBC
NT-PROBNP SERPL IA-MCNC: 59 PG/ML (ref 0–125)
PLATELET # BLD AUTO: 193 K/UL (ref 164–446)
PMV BLD AUTO: 10.1 FL (ref 9–12.9)
POTASSIUM SERPL-SCNC: 4.6 MMOL/L (ref 3.6–5.5)
PROT SERPL-MCNC: 7 G/DL (ref 6–8.2)
RBC # BLD AUTO: 4.53 M/UL (ref 4.2–5.4)
RSV RNA SPEC QL NAA+PROBE: NEGATIVE
SARS-COV-2 RNA RESP QL NAA+PROBE: NOTDETECTED
SODIUM SERPL-SCNC: 136 MMOL/L (ref 135–145)
SPECIMEN SOURCE: NORMAL
TROPONIN T SERPL-MCNC: 11 NG/L (ref 6–19)
WBC # BLD AUTO: 9.2 K/UL (ref 4.8–10.8)

## 2022-07-27 PROCEDURE — 71045 X-RAY EXAM CHEST 1 VIEW: CPT

## 2022-07-27 PROCEDURE — 700105 HCHG RX REV CODE 258: Performed by: EMERGENCY MEDICINE

## 2022-07-27 PROCEDURE — 94760 N-INVAS EAR/PLS OXIMETRY 1: CPT

## 2022-07-27 PROCEDURE — 36415 COLL VENOUS BLD VENIPUNCTURE: CPT

## 2022-07-27 PROCEDURE — 94640 AIRWAY INHALATION TREATMENT: CPT

## 2022-07-27 PROCEDURE — 0241U HCHG SARS-COV-2 COVID-19 NFCT DS RESP RNA 4 TRGT MIC: CPT

## 2022-07-27 PROCEDURE — 93005 ELECTROCARDIOGRAM TRACING: CPT | Performed by: EMERGENCY MEDICINE

## 2022-07-27 PROCEDURE — 99285 EMERGENCY DEPT VISIT HI MDM: CPT

## 2022-07-27 PROCEDURE — 85379 FIBRIN DEGRADATION QUANT: CPT

## 2022-07-27 PROCEDURE — 85025 COMPLETE CBC W/AUTO DIFF WBC: CPT

## 2022-07-27 PROCEDURE — 83880 ASSAY OF NATRIURETIC PEPTIDE: CPT

## 2022-07-27 PROCEDURE — 80053 COMPREHEN METABOLIC PANEL: CPT

## 2022-07-27 PROCEDURE — 84484 ASSAY OF TROPONIN QUANT: CPT

## 2022-07-27 PROCEDURE — 84145 PROCALCITONIN (PCT): CPT

## 2022-07-27 PROCEDURE — C9803 HOPD COVID-19 SPEC COLLECT: HCPCS | Performed by: EMERGENCY MEDICINE

## 2022-07-27 PROCEDURE — 700101 HCHG RX REV CODE 250: Performed by: EMERGENCY MEDICINE

## 2022-07-27 RX ORDER — SODIUM CHLORIDE 9 MG/ML
1000 INJECTION, SOLUTION INTRAVENOUS ONCE
Status: COMPLETED | OUTPATIENT
Start: 2022-07-27 | End: 2022-07-27

## 2022-07-27 RX ADMIN — SODIUM CHLORIDE 1000 ML: 9 INJECTION, SOLUTION INTRAVENOUS at 20:31

## 2022-07-27 RX ADMIN — ALBUTEROL SULFATE 5 MG: 2.5 SOLUTION RESPIRATORY (INHALATION) at 22:18

## 2022-07-28 ENCOUNTER — APPOINTMENT (OUTPATIENT)
Dept: RADIOLOGY | Facility: MEDICAL CENTER | Age: 82
DRG: 189 | End: 2022-07-28
Attending: EMERGENCY MEDICINE
Payer: MEDICARE

## 2022-07-28 PROBLEM — H54.7 BLINDNESS: Status: ACTIVE | Noted: 2022-07-28

## 2022-07-28 PROBLEM — J96.90 RESPIRATORY FAILURE (HCC): Status: ACTIVE | Noted: 2022-07-28

## 2022-07-28 LAB
APPEARANCE UR: CLEAR
BACTERIA #/AREA URNS HPF: NEGATIVE /HPF
BILIRUB UR QL STRIP.AUTO: NEGATIVE
COLOR UR: YELLOW
EKG IMPRESSION: NORMAL
EPI CELLS #/AREA URNS HPF: NEGATIVE /HPF
GLUCOSE BLD STRIP.AUTO-MCNC: 119 MG/DL (ref 65–99)
GLUCOSE BLD STRIP.AUTO-MCNC: 133 MG/DL (ref 65–99)
GLUCOSE BLD STRIP.AUTO-MCNC: 137 MG/DL (ref 65–99)
GLUCOSE BLD STRIP.AUTO-MCNC: 154 MG/DL (ref 65–99)
GLUCOSE UR STRIP.AUTO-MCNC: >=1000 MG/DL
HYALINE CASTS #/AREA URNS LPF: ABNORMAL /LPF
KETONES UR STRIP.AUTO-MCNC: NEGATIVE MG/DL
LEUKOCYTE ESTERASE UR QL STRIP.AUTO: ABNORMAL
MICRO URNS: ABNORMAL
NITRITE UR QL STRIP.AUTO: POSITIVE
PH UR STRIP.AUTO: 7 [PH] (ref 5–8)
PROCALCITONIN SERPL-MCNC: <0.05 NG/ML
PROT UR QL STRIP: NEGATIVE MG/DL
RBC # URNS HPF: ABNORMAL /HPF
RBC UR QL AUTO: ABNORMAL
SP GR UR STRIP.AUTO: 1.02
UROBILINOGEN UR STRIP.AUTO-MCNC: 1 MG/DL
WBC #/AREA URNS HPF: ABNORMAL /HPF

## 2022-07-28 PROCEDURE — 81001 URINALYSIS AUTO W/SCOPE: CPT

## 2022-07-28 PROCEDURE — 700111 HCHG RX REV CODE 636 W/ 250 OVERRIDE (IP)

## 2022-07-28 PROCEDURE — 700102 HCHG RX REV CODE 250 W/ 637 OVERRIDE(OP): Performed by: STUDENT IN AN ORGANIZED HEALTH CARE EDUCATION/TRAINING PROGRAM

## 2022-07-28 PROCEDURE — A9270 NON-COVERED ITEM OR SERVICE: HCPCS | Performed by: STUDENT IN AN ORGANIZED HEALTH CARE EDUCATION/TRAINING PROGRAM

## 2022-07-28 PROCEDURE — 71275 CT ANGIOGRAPHY CHEST: CPT | Mod: ME

## 2022-07-28 PROCEDURE — 96374 THER/PROPH/DIAG INJ IV PUSH: CPT

## 2022-07-28 PROCEDURE — 99223 1ST HOSP IP/OBS HIGH 75: CPT | Mod: AI | Performed by: STUDENT IN AN ORGANIZED HEALTH CARE EDUCATION/TRAINING PROGRAM

## 2022-07-28 PROCEDURE — 770006 HCHG ROOM/CARE - MED/SURG/GYN SEMI*

## 2022-07-28 PROCEDURE — 700117 HCHG RX CONTRAST REV CODE 255: Performed by: EMERGENCY MEDICINE

## 2022-07-28 PROCEDURE — 82962 GLUCOSE BLOOD TEST: CPT | Mod: 91

## 2022-07-28 PROCEDURE — 99285 EMERGENCY DEPT VISIT HI MDM: CPT

## 2022-07-28 RX ORDER — CIPROFLOXACIN 250 MG/1
250 TABLET, FILM COATED ORAL DAILY
Status: ON HOLD | COMMUNITY
Start: 2022-07-12 | End: 2022-07-31

## 2022-07-28 RX ORDER — DEXTROSE MONOHYDRATE 25 G/50ML
25 INJECTION, SOLUTION INTRAVENOUS
Status: DISCONTINUED | OUTPATIENT
Start: 2022-07-28 | End: 2022-07-31 | Stop reason: HOSPADM

## 2022-07-28 RX ORDER — LATANOPROST 50 UG/ML
1 SOLUTION/ DROPS OPHTHALMIC NIGHTLY
Status: DISCONTINUED | OUTPATIENT
Start: 2022-07-28 | End: 2022-07-31 | Stop reason: HOSPADM

## 2022-07-28 RX ORDER — LISINOPRIL 20 MG/1
40 TABLET ORAL DAILY
Status: DISCONTINUED | OUTPATIENT
Start: 2022-07-28 | End: 2022-07-31 | Stop reason: HOSPADM

## 2022-07-28 RX ORDER — METOPROLOL SUCCINATE 25 MG/1
25 TABLET, EXTENDED RELEASE ORAL DAILY
Status: DISCONTINUED | OUTPATIENT
Start: 2022-07-28 | End: 2022-07-31 | Stop reason: HOSPADM

## 2022-07-28 RX ORDER — SIMVASTATIN 20 MG
5 TABLET ORAL NIGHTLY
Status: DISCONTINUED | OUTPATIENT
Start: 2022-07-28 | End: 2022-07-31 | Stop reason: HOSPADM

## 2022-07-28 RX ORDER — SCOLOPAMINE TRANSDERMAL SYSTEM 1 MG/1
1 PATCH, EXTENDED RELEASE TRANSDERMAL
Status: DISCONTINUED | OUTPATIENT
Start: 2022-07-28 | End: 2022-07-31 | Stop reason: HOSPADM

## 2022-07-28 RX ORDER — DORZOLAMIDE HCL 20 MG/ML
1 SOLUTION/ DROPS OPHTHALMIC 2 TIMES DAILY
Status: DISCONTINUED | OUTPATIENT
Start: 2022-07-28 | End: 2022-07-31 | Stop reason: HOSPADM

## 2022-07-28 RX ORDER — ONDANSETRON 2 MG/ML
4 INJECTION INTRAMUSCULAR; INTRAVENOUS EVERY 6 HOURS PRN
Status: DISCONTINUED | OUTPATIENT
Start: 2022-07-28 | End: 2022-07-31 | Stop reason: HOSPADM

## 2022-07-28 RX ADMIN — METOPROLOL SUCCINATE 25 MG: 25 TABLET, EXTENDED RELEASE ORAL at 06:27

## 2022-07-28 RX ADMIN — DORZOLAMIDE HCL 1 DROP: 20 SOLUTION/ DROPS OPHTHALMIC at 08:32

## 2022-07-28 RX ADMIN — INSULIN HUMAN 3 UNITS: 100 INJECTION, SOLUTION PARENTERAL at 13:23

## 2022-07-28 RX ADMIN — IOHEXOL 40 ML: 350 INJECTION, SOLUTION INTRAVENOUS at 01:25

## 2022-07-28 RX ADMIN — ONDANSETRON 4 MG: 2 INJECTION INTRAMUSCULAR; INTRAVENOUS at 06:27

## 2022-07-28 RX ADMIN — LISINOPRIL 40 MG: 20 TABLET ORAL at 06:27

## 2022-07-28 RX ADMIN — SIMVASTATIN 5 MG: 20 TABLET, FILM COATED ORAL at 21:09

## 2022-07-28 RX ADMIN — RIVAROXABAN 10 MG: 10 TABLET, FILM COATED ORAL at 06:29

## 2022-07-28 RX ADMIN — RIVAROXABAN 10 MG: 10 TABLET, FILM COATED ORAL at 18:55

## 2022-07-28 RX ADMIN — SIMVASTATIN 5 MG: 20 TABLET, FILM COATED ORAL at 06:52

## 2022-07-28 RX ADMIN — DORZOLAMIDE HCL 1 DROP: 20 SOLUTION/ DROPS OPHTHALMIC at 18:55

## 2022-07-28 RX ADMIN — LATANOPROST 1 DROP: 50 SOLUTION OPHTHALMIC at 21:09

## 2022-07-28 ASSESSMENT — ENCOUNTER SYMPTOMS
NAUSEA: 1
BRUISES/BLEEDS EASILY: 0
DOUBLE VISION: 0
SHORTNESS OF BREATH: 0
DEPRESSION: 0
CHILLS: 0
NAUSEA: 0
ABDOMINAL PAIN: 0
BLURRED VISION: 0
MYALGIAS: 0
HEMOPTYSIS: 0
SHORTNESS OF BREATH: 1
HEARTBURN: 0
COUGH: 0
DIZZINESS: 0
NECK PAIN: 0
FEVER: 0
HEADACHES: 0
PALPITATIONS: 0

## 2022-07-28 ASSESSMENT — LIFESTYLE VARIABLES
HAVE PEOPLE ANNOYED YOU BY CRITICIZING YOUR DRINKING: NO
TOTAL SCORE: 0
TOTAL SCORE: 0
ON A TYPICAL DAY WHEN YOU DRINK ALCOHOL HOW MANY DRINKS DO YOU HAVE: 0
EVER FELT BAD OR GUILTY ABOUT YOUR DRINKING: NO
TOTAL SCORE: 0
HOW MANY TIMES IN THE PAST YEAR HAVE YOU HAD 5 OR MORE DRINKS IN A DAY: 0
HAVE YOU EVER FELT YOU SHOULD CUT DOWN ON YOUR DRINKING: NO
EVER HAD A DRINK FIRST THING IN THE MORNING TO STEADY YOUR NERVES TO GET RID OF A HANGOVER: NO
CONSUMPTION TOTAL: NEGATIVE
ALCOHOL_USE: NO
AVERAGE NUMBER OF DAYS PER WEEK YOU HAVE A DRINK CONTAINING ALCOHOL: 0
DOES PATIENT WANT TO STOP DRINKING: NO

## 2022-07-28 ASSESSMENT — COGNITIVE AND FUNCTIONAL STATUS - GENERAL
MOVING FROM LYING ON BACK TO SITTING ON SIDE OF FLAT BED: A LITTLE
SUGGESTED CMS G CODE MODIFIER MOBILITY: CJ
CLIMB 3 TO 5 STEPS WITH RAILING: A LITTLE
MOBILITY SCORE: 20
DRESSING REGULAR LOWER BODY CLOTHING: A LITTLE
STANDING UP FROM CHAIR USING ARMS: A LITTLE
SUGGESTED CMS G CODE MODIFIER DAILY ACTIVITY: CJ
HELP NEEDED FOR BATHING: A LITTLE
TOILETING: A LITTLE
WALKING IN HOSPITAL ROOM: A LITTLE
DAILY ACTIVITIY SCORE: 21

## 2022-07-28 ASSESSMENT — PATIENT HEALTH QUESTIONNAIRE - PHQ9
2. FEELING DOWN, DEPRESSED, IRRITABLE, OR HOPELESS: NOT AT ALL
1. LITTLE INTEREST OR PLEASURE IN DOING THINGS: NOT AT ALL
SUM OF ALL RESPONSES TO PHQ9 QUESTIONS 1 AND 2: 0

## 2022-07-28 ASSESSMENT — FIBROSIS 4 INDEX: FIB4 SCORE: 2.44

## 2022-07-28 NOTE — H&P
Hospital Medicine History & Physical Note    Date of Service  7/28/2022    Primary Care Physician  TALISHA Johnson.    Consultants  none     Specialist Names: none     Code Status  Full Code    Chief Complaint  Chief Complaint   Patient presents with   • Nausea     PT BIB EMS from home. PT states she has had nausea all day.   • Leg Pain     PT daughter reports PT legs seem more swollen last few days. PT states she has not noticed a difference in size. PT received 4 of Zofran by EMS and 30 of Toradol.         History of Presenting Illness  Sarah Marie is a 82 y.o. female past medical history of legally blind, hypertension, hyperlipidemia, type 2 diabetes mellitus who presented 7/27/2022 with chief complaint of leg pain.  Daughter also reports blood pressure has been elevated with associated dizziness and nausea.  No relieving or exacerbating factors are noted.  She is vaccinated for COVID-19.  She denies any blood thinners.    In the ER, she was noted to be hypoxic 87% on room air.  Further work-up revealed elevated D-dimer for which CT of the chest was performed.  Thankfully CTA chest was negative for PE.  Given that she is requiring oxygen she will be hospitalized for oxygen supplementation and further work-up.    I discussed the plan of care with patient.    Review of Systems  Review of Systems   Constitutional: Negative for chills and fever.   HENT: Negative for hearing loss and tinnitus.    Eyes: Negative for blurred vision and double vision.   Respiratory: Positive for shortness of breath. Negative for cough and hemoptysis.    Cardiovascular: Positive for leg swelling. Negative for chest pain and palpitations.   Gastrointestinal: Negative for heartburn and nausea.   Genitourinary: Negative for dysuria and urgency.   Musculoskeletal: Negative for myalgias and neck pain.   Skin: Negative for itching and rash.   Neurological: Negative for dizziness and headaches.   Endo/Heme/Allergies: Does not bruise/bleed  easily.   Psychiatric/Behavioral: Negative for depression and suicidal ideas.       Past Medical History   has a past medical history of Angina, Arthritis, Backpain, CATARACT, Diabetes, Diverticulosis, Hemorrhagic disorder (HCC), High cholesterol, Hypertension, Stroke (HCC), and Unspecified urinary incontinence.    Surgical History   has a past surgical history that includes other abdominal surgery; gyn surgery; other; other; other orthopedic surgery (1997); septoplasty (12/10/2015); and nasal endoscopy (Bilateral, 12/10/2015).     Family History  Reviewed and not pertinent     Family history reviewed with patient. There is no family history that is pertinent to the chief complaint.     Social History   reports that she has quit smoking. She has never used smokeless tobacco. She reports that she does not drink alcohol and does not use drugs.    Allergies  Allergies   Allergen Reactions   • Clindamycin Rash         • Codeine Vomiting   • Diclofenac      Gi problems     • Hydrocodone Nausea   • Iodine Unspecified     Skin irritation     • Metformin      dairrhea   • Omnicef [Cefdinir] Rash     rash   • Oxycodone Vomiting   • Pcn [Penicillins] Rash     As  A child         Medications  Prior to Admission Medications   Prescriptions Last Dose Informant Patient Reported? Taking?   Cholecalciferol (VITAMIN D3) 2000 UNIT Cap  Family Member Yes No   Sig: Take 2,000 Units by mouth every day.   Empagliflozin (JARDIANCE) 25 MG Tab   No No   Sig: Take 1 Tab by mouth every day. Please make an appointment for further refills.   alendronate (FOSAMAX) 70 MG Tab   Yes No   Sig: Take 70 mg by mouth every day.   dorzolamide (TRUSOPT) 2 % Solution  Family Member Yes No   Sig: Place 1 Drop in both eyes 2 Times a Day.   estrogens conjugated, synthetic A, (CENESTIN) 1.25 MG tablet  Family Member Yes No   Sig: Take 1.25 mg by mouth every day.   latanoprost (XALATAN) 0.005 % Solution  Family Member Yes No   Sig: Place 1 Drop in both eyes  every evening.   lisinopril (PRINIVIL, ZESTRIL) 40 MG tablet  Family Member Yes No   Sig: Take 1 Tab by mouth every day.   metoprolol SR (TOPROL XL) 25 MG TABLET SR 24 HR   No No   Sig: Take 1 Tablet by mouth every day.   simvastatin (ZOCOR) 5 MG Tab   Yes No   Sig: Take 5 mg by mouth every evening.      Facility-Administered Medications: None       Physical Exam  Temp:  [36.6 °C (97.8 °F)] 36.6 °C (97.8 °F)  Pulse:  [] 94  Resp:  [17-25] 17  BP: (123-153)/(59-80) 145/77  SpO2:  [87 %-100 %] 96 %  Blood Pressure : (!) 145/77   Temperature: 36.6 °C (97.8 °F)   Pulse: 94   Respiration: 17   Pulse Oximetry: 96 %       Physical Exam  Vitals and nursing note reviewed.   Constitutional:       Appearance: Normal appearance.   HENT:      Head: Normocephalic and atraumatic.      Right Ear: Tympanic membrane normal.      Left Ear: Tympanic membrane normal.      Nose: Nose normal.      Mouth/Throat:      Mouth: Mucous membranes are moist.      Pharynx: Oropharynx is clear.   Eyes:      Extraocular Movements: Extraocular movements intact.      Comments: Blind    Cardiovascular:      Rate and Rhythm: Normal rate and regular rhythm.      Pulses: Normal pulses.      Heart sounds: Normal heart sounds.   Pulmonary:      Effort: Pulmonary effort is normal.      Breath sounds: Normal breath sounds.   Abdominal:      General: Bowel sounds are normal. There is no distension.      Palpations: Abdomen is soft. There is no mass.   Musculoskeletal:         General: Normal range of motion.      Cervical back: Neck supple.   Skin:     General: Skin is warm.      Capillary Refill: Capillary refill takes less than 2 seconds.   Neurological:      General: No focal deficit present.      Mental Status: She is alert and oriented to person, place, and time. Mental status is at baseline.      Cranial Nerves: No cranial nerve deficit.      Sensory: No sensory deficit.   Psychiatric:         Mood and Affect: Mood normal.         Behavior:  Behavior normal.         Laboratory:  Recent Labs     07/27/22 1942   WBC 9.2   RBC 4.53   HEMOGLOBIN 14.0   HEMATOCRIT 41.4   MCV 91.4   MCH 30.9   MCHC 33.8   RDW 43.1   PLATELETCT 193   MPV 10.1     Recent Labs     07/27/22 1942   SODIUM 136   POTASSIUM 4.6   CHLORIDE 103   CO2 23   GLUCOSE 161*   BUN 16   CREATININE 0.49*   CALCIUM 8.9     Recent Labs     07/27/22 1942   ALTSGPT 9   ASTSGOT 17   ALKPHOSPHAT 95   TBILIRUBIN 0.2   GLUCOSE 161*         Recent Labs     07/27/22 1942   NTPROBNP 59         Recent Labs     07/27/22 1942   TROPONINT 11       Imaging:  CT-CTA CHEST PULMONARY ARTERY W/ RECONS   Final Result         1.  No pulmonary embolus appreciated.   2.  Atherosclerosis and atherosclerotic coronary artery disease.      DX-CHEST-PORTABLE (1 VIEW)   Final Result         1.  Bilateral basilar atelectasis, no focal infiltrate          X-Ray:  I have personally reviewed the images and compared with prior images.    Assessment/Plan:  Justification for Admission Status  I anticipate this patient will require at least two midnights for appropriate medical management, necessitating inpatient admission because Respiratory failure requiring oxygen supplementation.  May need to perform further work-up and wean her oxygen as tolerated.  I believe this will require greater than 2 midnights of hospital stay.    Patient will need a Med/Surg bed on MEDICAL service .  The need is secondary to respiratory failure requiring oxygen supplementation..    * Respiratory failure (HCC)- (present on admission)  Assessment & Plan  Unclear etiology maybe seconadry to atelectasis. Encourage Incentive spirometry   CTA Chest negative for PE. Covid/RSV/Flu negative  Chest x-ray is negative for any consolidation or infiltrates however check procalcitonin.  If elevated start on antibiotics.  RT/02 protocol   Wean oxygen as tolerated       Blindness  Assessment & Plan  Bilateral   Fall precautions     Other hyperlipidemia- (present  on admission)  Assessment & Plan  Continue with statin    Primary hypertension- (present on admission)  Assessment & Plan  Continue lisinopril 40 mg daily and Toprol 25 mg    Diabetes mellitus type II, uncontrolled (HCC)- (present on admission)  Assessment & Plan  Sliding-scale with Accu-Cheks and hypoglycemia protocol.  Hold empagliflozin at this time      VTE prophylaxis: Xarelto 10 mg daily as prophylaxis

## 2022-07-28 NOTE — ASSESSMENT & PLAN NOTE
Likely in setting of Atelectasis    Encourage Incentive spirometry   CTA Chest negative for PE. Covid/RSV/Flu negative  RT/02 protocol   Wean oxygen as tolerated and check home oxygen eval on 7/28 and set up if indicated.

## 2022-07-28 NOTE — PROGRESS NOTES
MD Sandy notified about patients assisted fall by LADY Keen.   Pharmacy notified for medication review

## 2022-07-28 NOTE — ED NOTES
Patient up to bedside commode with 1 RN assist.     Phone report given to arianne White. ED tech Roge to transport patient with chart and belongings.

## 2022-07-28 NOTE — ED NOTES
Med rec has been updated and completed per pt's home pharmacy ([facility])

## 2022-07-28 NOTE — PROGRESS NOTES
Hospital Medicine Daily Progress Note    Date of Service  7/28/2022    Chief Complaint  Sarah Marie is a 82 y.o. female admitted 7/27/2022 with nausea and weakness.    Hospital Course  Ms. Marie is a 82 y.o. female past medical history of legally blind, hypertension, hyperlipidemia, type 2 diabetes mellitus who presented 7/27/2022 with chief complaint of leg pain.  Daughter also reports blood pressure has been elevated with associated dizziness and nausea.  No relieving or exacerbating factors are noted.  She is vaccinated for COVID-19.  She denies any blood thinners.   In the ER, she was noted to be hypoxic 87% on room air.  Further work-up revealed elevated D-dimer for which CT of the chest was performed.  Thankfully CTA chest was negative for PE.  Given that she is requiring oxygen she will be hospitalized for oxygen supplementation and further work-up.       Interval Problem Update  7/28: Ms. Marie was evaluated and examined in the ER. She is on 2 liters of oxygen this morning. She complains of ongoing nausea. I discussed with her RN Sabino about home oxygen eval for possible d/c home on O2. Incentive spirometry 10x/hour while awake.     I have discussed this patient's plan of care and discharge plan at IDT rounds today with Case Management, Nursing, Nursing leadership, and other members of the IDT team.    Consultants/Specialty  none    Code Status  Full Code    Disposition  Patient is not medically cleared for discharge.   Anticipate discharge to to home with close outpatient follow-up.  I have placed the appropriate orders for post-discharge needs.    Review of Systems  Review of Systems   Constitutional: Negative for chills and fever.   Eyes:        She is blind   Respiratory: Negative for shortness of breath.    Cardiovascular: Negative for chest pain.   Gastrointestinal: Positive for nausea. Negative for abdominal pain.   All other systems reviewed and are negative.       Physical Exam  Temp:  [36.4 °C  (97.6 °F)-37.1 °C (98.8 °F)] 37.1 °C (98.8 °F)  Pulse:  [] 112  Resp:  [14-25] 20  BP: (123-196)/() 138/94  SpO2:  [87 %-100 %] 96 %    Physical Exam  Vitals and nursing note reviewed.   Constitutional:       General: She is not in acute distress.  HENT:      Head: Normocephalic and atraumatic.      Mouth/Throat:      Mouth: Mucous membranes are dry.      Pharynx: Oropharynx is clear.   Eyes:      General: No scleral icterus.     Conjunctiva/sclera: Conjunctivae normal.   Cardiovascular:      Rate and Rhythm: Normal rate and regular rhythm.      Heart sounds: No murmur heard.  Pulmonary:      Effort: Pulmonary effort is normal.      Breath sounds: Normal breath sounds.      Comments: 2 liters of nasal cannula oxygen  Abdominal:      General: There is no distension.      Palpations: Abdomen is soft.      Tenderness: There is no abdominal tenderness.   Musculoskeletal:      Cervical back: Normal range of motion and neck supple.      Right lower leg: No edema.      Left lower leg: No edema.   Skin:     General: Skin is warm and dry.   Neurological:      General: No focal deficit present.      Mental Status: She is alert and oriented to person, place, and time.   Psychiatric:         Mood and Affect: Mood normal.         Behavior: Behavior normal.         Thought Content: Thought content normal.         Judgment: Judgment normal.         Fluids  No intake or output data in the 24 hours ending 07/28/22 0734    Laboratory  Recent Labs     07/27/22 1942   WBC 9.2   RBC 4.53   HEMOGLOBIN 14.0   HEMATOCRIT 41.4   MCV 91.4   MCH 30.9   MCHC 33.8   RDW 43.1   PLATELETCT 193   MPV 10.1     Recent Labs     07/27/22 1942   SODIUM 136   POTASSIUM 4.6   CHLORIDE 103   CO2 23   GLUCOSE 161*   BUN 16   CREATININE 0.49*   CALCIUM 8.9                   Imaging  CT-CTA CHEST PULMONARY ARTERY W/ RECONS   Final Result         1.  No pulmonary embolus appreciated.   2.  Atherosclerosis and atherosclerotic coronary artery  disease.      DX-CHEST-PORTABLE (1 VIEW)   Final Result         1.  Bilateral basilar atelectasis, no focal infiltrate           Assessment/Plan  * Respiratory failure (HCC)- (present on admission)  Assessment & Plan  Unclear etiology though she desats to the 80's on room air with exertion.   Encourage Incentive spirometry   CTA Chest negative for PE. Covid/RSV/Flu negative  RT/02 protocol   Wean oxygen as tolerated and check home oxygen eval on 7/28 and set up if indicated.       Blindness  Assessment & Plan  Bilateral   Fall precautions     Other hyperlipidemia- (present on admission)  Assessment & Plan  Continue with statin    Primary hypertension- (present on admission)  Assessment & Plan  Continue lisinopril 40 mg daily and Toprol 25 mg    Diabetes mellitus type II, uncontrolled (HCC)- (present on admission)  Assessment & Plan  Sliding-scale with Accu-Cheks and hypoglycemia protocol.  Hold empagliflozin at this time       VTE prophylaxis: Xarelto 10 mg daily as prophylaxis    I have performed a physical exam and reviewed and updated ROS and Plan today (7/28/2022). In review of yesterday's note (7/27/2022), there are no changes except as documented above.

## 2022-07-28 NOTE — ED NOTES
Med Rec PARTIAL per Reconciled Outside Information. Unknown last doses. Unable to reach Pt's family, home pharmacy is currently closed.  Home Pharmacy:  [facility]

## 2022-07-28 NOTE — ED NOTES
PT sitting up in bed eating [place] and drinking juice. PT and family state she was hungry so she is eating. PT also states she got up to bedside commode self x3 because she doesn't need help. PT removed oxygen and is stating in low 90's. PT refusing to put oxygen back on at this time.

## 2022-07-28 NOTE — PROGRESS NOTES
Assumed care of patient. Patient resting comfortably. Tele monitor in place. Call light within reach.  Patient educated to call for assistance prior to getting up.

## 2022-07-28 NOTE — ED TRIAGE NOTES
Chief Complaint   Patient presents with   • Nausea     PT BIB EMS from home. PT states she has had nausea all day.   • Leg Pain     PT daughter reports PT legs seem more swollen last few days. PT states she has not noticed a difference in size. PT received 4 of Zofran by EMS and 30 of Toradol.

## 2022-07-28 NOTE — ED NOTES
PT up standby assist. PT o2 dropped to 84% on room air while ambulating. PT back to bed and back on 02. ERP notified of 02 dropping.

## 2022-07-28 NOTE — ED PROVIDER NOTES
ED Provider Note    Scribed for Raheel Castro M.D. by Raissa Thornton. 7/27/2022, 7:49 PM.    Primary care provider: NORMAN Johnson  Means of arrival: Ambulance  History obtained from: Patient  History limited by: None    CHIEF COMPLAINT  Chief Complaint   Patient presents with   • Nausea     PT BIB EMS from home. PT states she has had nausea all day.   • Leg Pain     PT daughter reports PT legs seem more swollen last few days. PT states she has not noticed a difference in size. PT received 4 of Zofran by EMS and 30 of Toradol.         HPI  Sarah Marie is a 82 y.o. female who presents to the Emergency Department for evaluation of leg weakness and pain onset few days. Patient also reports that her blood pressure has been high, however she has been unable to check it at home because she is blind. She admits to associated symptoms of dizziness, and nausea but denies falls, vomiting, chest pain, cough, diarrhea, dysuria, polydipsia, or polyuria. No alleviating factors were reported. Patient is not on home oxygen. She is vaccinated for COVID. No history of congestive heart failure or blood clot in legs or lungs    REVIEW OF SYSTEMS  Pertinent positives include leg weakness, leg pain, dizziness, and nausea. Pertinent negatives include falls, vomiting, chest pain, cough, diarrhea, dysuria, polydipsia, or polyuria. All other systems negative.    PAST MEDICAL HISTORY   has a past medical history of Angina, Arthritis, Backpain, CATARACT, Diabetes, Diverticulosis, Hemorrhagic disorder (HCC), High cholesterol, Hypertension, Stroke (HCC), and Unspecified urinary incontinence.    SURGICAL HISTORY   has a past surgical history that includes other abdominal surgery; gyn surgery; other; other; other orthopedic surgery (1997); septoplasty (12/10/2015); and nasal endoscopy (Bilateral, 12/10/2015).    SOCIAL HISTORY  Social History     Tobacco Use   • Smoking status: Former Smoker   • Smokeless tobacco: Never Used  "  Substance Use Topics   • Alcohol use: No   • Drug use: No      Social History     Substance and Sexual Activity   Drug Use No       FAMILY HISTORY  History reviewed. No pertinent family history.    CURRENT MEDICATIONS  Current Outpatient Medications:   •  alendronate (FOSAMAX) 70 MG Tab, Take 70 mg by mouth every day., Disp: , Rfl:   •  simvastatin (ZOCOR) 5 MG Tab, Take 5 mg by mouth every evening., Disp: , Rfl:   •  metoprolol SR (TOPROL XL) 25 MG TABLET SR 24 HR, Take 1 Tablet by mouth every day., Disp: 90 Tablet, Rfl: 3  •  Empagliflozin (JARDIANCE) 25 MG Tab, Take 1 Tab by mouth every day. Please make an appointment for further refills., Disp: 30 Tab, Rfl: 1  •  estrogens conjugated, synthetic A, (CENESTIN) 1.25 MG tablet, Take 1.25 mg by mouth every day., Disp: , Rfl:   •  Cholecalciferol (VITAMIN D3) 2000 UNIT Cap, Take 2,000 Units by mouth every day., Disp: , Rfl:   •  dorzolamide (TRUSOPT) 2 % Solution, Place 1 Drop in both eyes 2 Times a Day., Disp: , Rfl:   •  latanoprost (XALATAN) 0.005 % Solution, Place 1 Drop in both eyes every evening., Disp: , Rfl:   •  lisinopril (PRINIVIL, ZESTRIL) 40 MG tablet, Take 1 Tab by mouth every day., Disp: , Rfl:       ALLERGIES  Allergies   Allergen Reactions   • Clindamycin Rash         • Codeine Vomiting   • Diclofenac      Gi problems     • Hydrocodone Nausea   • Iodine Unspecified     Skin irritation     • Metformin      dairrhea   • Omnicef [Cefdinir] Rash     rash   • Oxycodone Vomiting   • Pcn [Penicillins] Rash     As  A child         PHYSICAL EXAM  VITAL SIGNS: /59   Pulse 99   Temp 36.6 °C (97.8 °F) (Temporal)   Resp 18   Ht 1.676 m (5' 6\")   Wt 84.4 kg (186 lb)   SpO2 93%   BMI 30.02 kg/m²     Constitutional: Well developed, Well nourished, mild distress.   HENT: Dry mucus membranes. Normocephalic, Atraumatic, mask in place.  Eyes: Conjunctiva normal, No discharge.   Cardiovascular: Normal heart rate, Normal rhythm, No murmurs, equal pulses. "   Pulmonary: Normal breath sounds, No respiratory distress, No wheezing, No rales, No rhonchi.  Chest: No chest wall tenderness or deformity.   Abdomen:Soft, No tenderness, No masses, no rebound, no guarding.   Back: No CVA tenderness.   Musculoskeletal: Trace edema bilaterally on ankles. No major deformities noted, No tenderness.   Skin: Warm, Dry, No erythema, No rash.   Neurologic: Alert & oriented x 3, Normal motor function,  No focal deficits noted.   Psychiatric: Affect normal, Judgment normal, Mood normal.     LABS  Results for orders placed or performed during the hospital encounter of 07/27/22   CBC WITH DIFFERENTIAL   Result Value Ref Range    WBC 9.2 4.8 - 10.8 K/uL    RBC 4.53 4.20 - 5.40 M/uL    Hemoglobin 14.0 12.0 - 16.0 g/dL    Hematocrit 41.4 37.0 - 47.0 %    MCV 91.4 81.4 - 97.8 fL    MCH 30.9 27.0 - 33.0 pg    MCHC 33.8 33.6 - 35.0 g/dL    RDW 43.1 35.9 - 50.0 fL    Platelet Count 193 164 - 446 K/uL    MPV 10.1 9.0 - 12.9 fL    Neutrophils-Polys 79.90 (H) 44.00 - 72.00 %    Lymphocytes 12.60 (L) 22.00 - 41.00 %    Monocytes 6.10 0.00 - 13.40 %    Eosinophils 0.20 0.00 - 6.90 %    Basophils 0.80 0.00 - 1.80 %    Immature Granulocytes 0.40 0.00 - 0.90 %    Nucleated RBC 0.00 /100 WBC    Neutrophils (Absolute) 7.39 (H) 2.00 - 7.15 K/uL    Lymphs (Absolute) 1.16 1.00 - 4.80 K/uL    Monos (Absolute) 0.56 0.00 - 0.85 K/uL    Eos (Absolute) 0.02 0.00 - 0.51 K/uL    Baso (Absolute) 0.07 0.00 - 0.12 K/uL    Immature Granulocytes (abs) 0.04 0.00 - 0.11 K/uL    NRBC (Absolute) 0.00 K/uL   COMP METABOLIC PANEL   Result Value Ref Range    Sodium 136 135 - 145 mmol/L    Potassium 4.6 3.6 - 5.5 mmol/L    Chloride 103 96 - 112 mmol/L    Co2 23 20 - 33 mmol/L    Anion Gap 10.0 7.0 - 16.0    Glucose 161 (H) 65 - 99 mg/dL    Bun 16 8 - 22 mg/dL    Creatinine 0.49 (L) 0.50 - 1.40 mg/dL    Calcium 8.9 8.5 - 10.5 mg/dL    AST(SGOT) 17 12 - 45 U/L    ALT(SGPT) 9 2 - 50 U/L    Alkaline Phosphatase 95 30 - 99 U/L    Total  Bilirubin 0.2 0.1 - 1.5 mg/dL    Albumin 4.2 3.2 - 4.9 g/dL    Total Protein 7.0 6.0 - 8.2 g/dL    Globulin 2.8 1.9 - 3.5 g/dL    A-G Ratio 1.5 g/dL   TROPONIN   Result Value Ref Range    Troponin T 11 6 - 19 ng/L   CoV-2, FLU A/B, and RSV by PCR (2-4 Hours CEPHEID) : Collect NP swab in VTM    Specimen: Respirate   Result Value Ref Range    Influenza virus A RNA Negative Negative    Influenza virus B, PCR Negative Negative    RSV, PCR Negative Negative    SARS-CoV-2 by PCR NotDetected     SARS-CoV-2 Source NP Swab    ESTIMATED GFR   Result Value Ref Range    GFR (CKD-EPI) 93 >60 mL/min/1.73 m 2   proBrain Natriuretic Peptide, NT   Result Value Ref Range    NT-proBNP 59 0 - 125 pg/mL   D-DIMER   Result Value Ref Range    D-Dimer Screen 1.39 (H) 0.00 - 0.50 ug/mL (FEU)   EKG (NOW)   Result Value Ref Range    Report       [facility] Emergency Dept.    Test Date:  2022  Pt Name:    EDER RUANO                 Department: ER  MRN:        [id number]                      Room:       RD 10  Gender:     Female                       Technician: [id number]  :        [id number]                   Requested By:YSABEL LAL  Order #:    [id number]                    Reading MD: YSABEL LAL MD    Measurements  Intervals                                Axis  Rate:       91                           P:          72  CA:         179                          QRS:        -28  QRSD:       81                           T:          56  QT:         398  QTc:        491    Interpretive Statements  Sinus rhythm, rate of 91, No st elevation.  Borderline left axis deviation  Borderline prolonged QT interval  Compared to ECG 10/21/2021 13:05:56  Left anterior fascicular block no longer present  Electronically Signed On 2022 0:33:49 PDT by YSABEL LAL MD       All labs reviewed by me.    EKG  12 Lead EKG interpreted by me as shown above.     RADIOLOGY  DX-CHEST-PORTABLE (1 VIEW)   Final Result          1.  Bilateral basilar atelectasis, no focal infiltrate      CT-CTA CHEST PULMONARY ARTERY W/ RECONS    (Results Pending)     The radiologist's interpretation of all radiological studies have been reviewed by me.    COURSE & MEDICAL DECISION MAKING  Pertinent Labs & Imaging studies reviewed. (See chart for details)    7:49 PM - Patient seen and examined at bedside. The patient will receive IV fluids for dehydration. Ordered CBC with diff, CMP, Troponin, Urinalysis, and EKG to evaluate her symptoms. The differential diagnoses include but are not limited to: UTI vs COVID vs DKA vs Hyperglycemia vs Myocardial infarction.     9:58 PM - Patient was reevaluated at bedside. She states that she is feeling well and would like to go home. She has no chest pain and does not normally wear home oxygen. I informed the patient that she cannot go home because her oxygen is low. We will give her a breathing treatment and ambulate her.     10:41 PM - Patient was reevaluated at bedside. Her oxygen is slightly better.     HYDRATION: Based on the patient's presentation of Dehydration the patient was given IV fluids. IV Hydration was used because oral hydration was not adequate alone. Upon recheck following hydration, the patient was mildly improved.      Discussed the case with Dr. Cyr hospitalist is agreed to consult on hospitalization  Medical Decision Making: At this point time exact etiology of the patient's hypoxia is not clear to me.  CTA of the chest is pending to rule out pulmonary embolism.  Patient's lung exam is otherwise full.      DISPOSITION:  Patient will be hospitalized by Dr. Cyr in guarded condition.    FINAL IMPRESSION  1. Hypoxia    2. Weakness          Raissa CHEATHAM (Breana), am scribing for, and in the presence of, Raheel Castro M.D.    Electronically signed by: Raissa Salinas), 7/27/2022    Raheel CHEATHAM M.D. personally performed the services described in this documentation, as  scribed by Raissa Thornton in my presence, and it is both accurate and complete.    The note accurately reflects work and decisions made by me.  Raheel Castro M.D.  7/28/2022  12:47 AM

## 2022-07-28 NOTE — ED NOTES
"Pot sheets changed and pt placed into new diaper. Pt alert to self, year and place. Pt moves all extremities upon command, nad noted. Pt reports \"shakes.\" will check blood glucose.  "

## 2022-07-29 PROBLEM — N30.00 ACUTE CYSTITIS WITHOUT HEMATURIA: Status: ACTIVE | Noted: 2022-07-29

## 2022-07-29 LAB
GLUCOSE BLD STRIP.AUTO-MCNC: 112 MG/DL (ref 65–99)
GLUCOSE BLD STRIP.AUTO-MCNC: 121 MG/DL (ref 65–99)
GLUCOSE BLD STRIP.AUTO-MCNC: 149 MG/DL (ref 65–99)
GLUCOSE BLD STRIP.AUTO-MCNC: 160 MG/DL (ref 65–99)
GLUCOSE BLD STRIP.AUTO-MCNC: 182 MG/DL (ref 65–99)

## 2022-07-29 PROCEDURE — 770006 HCHG ROOM/CARE - MED/SURG/GYN SEMI*

## 2022-07-29 PROCEDURE — 99232 SBSQ HOSP IP/OBS MODERATE 35: CPT | Performed by: STUDENT IN AN ORGANIZED HEALTH CARE EDUCATION/TRAINING PROGRAM

## 2022-07-29 PROCEDURE — 700102 HCHG RX REV CODE 250 W/ 637 OVERRIDE(OP): Performed by: STUDENT IN AN ORGANIZED HEALTH CARE EDUCATION/TRAINING PROGRAM

## 2022-07-29 PROCEDURE — A9270 NON-COVERED ITEM OR SERVICE: HCPCS | Performed by: STUDENT IN AN ORGANIZED HEALTH CARE EDUCATION/TRAINING PROGRAM

## 2022-07-29 PROCEDURE — 82962 GLUCOSE BLOOD TEST: CPT | Mod: 91

## 2022-07-29 RX ORDER — METOPROLOL SUCCINATE 25 MG/1
25 TABLET, EXTENDED RELEASE ORAL DAILY
COMMUNITY

## 2022-07-29 RX ORDER — NITROFURANTOIN 25; 75 MG/1; MG/1
100 CAPSULE ORAL 2 TIMES DAILY WITH MEALS
Status: DISCONTINUED | OUTPATIENT
Start: 2022-07-29 | End: 2022-07-31 | Stop reason: HOSPADM

## 2022-07-29 RX ADMIN — LISINOPRIL 40 MG: 20 TABLET ORAL at 05:32

## 2022-07-29 RX ADMIN — NITROFURANTOIN MONOHYDRATE/MACROCRYSTALLINE 100 MG: 25; 75 CAPSULE ORAL at 18:05

## 2022-07-29 RX ADMIN — SIMVASTATIN 5 MG: 20 TABLET, FILM COATED ORAL at 22:03

## 2022-07-29 RX ADMIN — LATANOPROST 1 DROP: 50 SOLUTION OPHTHALMIC at 22:02

## 2022-07-29 RX ADMIN — DORZOLAMIDE HCL 1 DROP: 20 SOLUTION/ DROPS OPHTHALMIC at 18:07

## 2022-07-29 RX ADMIN — METOPROLOL SUCCINATE 25 MG: 25 TABLET, EXTENDED RELEASE ORAL at 05:32

## 2022-07-29 RX ADMIN — DORZOLAMIDE HCL 1 DROP: 20 SOLUTION/ DROPS OPHTHALMIC at 05:32

## 2022-07-29 RX ADMIN — RIVAROXABAN 10 MG: 10 TABLET, FILM COATED ORAL at 18:05

## 2022-07-29 ASSESSMENT — ENCOUNTER SYMPTOMS
FALLS: 1
BLURRED VISION: 0
BRUISES/BLEEDS EASILY: 0
COUGH: 0
FEVER: 0
NAUSEA: 0
SHORTNESS OF BREATH: 0
VOMITING: 0

## 2022-07-29 NOTE — PROGRESS NOTES
Pt. Received in bed awake, oriented to self and time. Pt. Forgets often that she is in the hospital. Pt. Legally blind. Bed alarm is on. Call light in reach, pt. Educated multiple times to call if needing to get out of bed to the bathroom. Pt. Has attempted 3x to get out of bed without calling, pt. Had a fall yesterday. Ensured pt. Call light is in reach. Pt. Noted at around 10pm sitting on the edge of bed, and attempting to get out of bed without using the call light. Telesitter established at around 2am as pt. Attempted without calling again for the 3rd time. Pt. Is in a corner room. Pt. Remained to be on RA sat. Reoriented pt. Due meds given. Needs attended.

## 2022-07-29 NOTE — PROGRESS NOTES
4 Eyes Skin Assessment Completed by LADY Marie and LADY Samson.    Head WDL  Ears WDL  Nose Jaundice  Mouth WDL  Neck Incision  Breast/Chest WDL  Shoulder Blades WDL  Spine WDL  (R) Arm/Elbow/Hand WDL  (L) Arm/Elbow/Hand WDL  Abdomen WDL  Groin Incision  Scrotum/Coccyx/Buttocks WDL  (R) Leg WDL  (L) Leg WDL  (R) Heel/Foot/Toe WDL  (L) Heel/Foot/Toe WDL          Devices In Places Blood Pressure Cuff      Interventions In Place N/A    Possible Skin Injury No    Pictures Uploaded Into Epic N/A  Wound Consult Placed N/A  RN Wound Prevention Protocol Ordered No

## 2022-07-29 NOTE — PROGRESS NOTES
Hospital Medicine Daily Progress Note    Date of Service  7/29/2022    Chief Complaint  Sarah Marie is a 82 y.o. female admitted 7/27/2022 with generalized weakness    Hospital Course  Ms. Marie is a 82 y.o. female past medical history of legally blind, hypertension, hyperlipidemia, type 2 diabetes mellitus who presented 7/27/2022 with chief complaint of leg pain.  Daughter also reports blood pressure has been elevated with associated dizziness and nausea.  No relieving or exacerbating factors are noted.  She is vaccinated for COVID-19.  She denies any blood thinners.   In the ER, she was noted to be hypoxic 87% on room air.  Further work-up revealed elevated D-dimer for which CT of the chest was performed.   CTA chest was negative for PE favoring atlecatasis .  Given that she is requiring oxygen she will be hospitalized for oxygen supplementation and further work-up.  Interval Problem Update  7/29/2022  On 2L oxygen saturating above 90%   labs unremarkable   Need home 02 evaluation  Continue incentive spirometer  Added  nitrofurantoin for UTI  Need PT/OT evaluation      I have discussed this patient's plan of care and discharge plan at IDT rounds today with Case Management, Nursing, Nursing leadership, and other members of the IDT team.    Consultants/Specialty  N/A    Code Status  Full Code    Disposition  Patient is not medically cleared for discharge.   Anticipate discharge to to home with close outpatient follow-up.  I have placed the appropriate orders for post-discharge needs.    Review of Systems  Review of Systems   Constitutional: Positive for malaise/fatigue. Negative for fever.   HENT: Negative for hearing loss.    Eyes: Negative for blurred vision.   Respiratory: Negative for cough and shortness of breath.    Cardiovascular: Negative for chest pain.   Gastrointestinal: Negative for nausea and vomiting.   Musculoskeletal: Positive for falls.   Skin: Negative for rash.   Endo/Heme/Allergies: Does not  bruise/bleed easily.        Physical Exam  Temp:  [36.1 °C (97 °F)-36.7 °C (98 °F)] 36.5 °C (97.7 °F)  Pulse:  [] 92  Resp:  [16-19] 16  BP: (105-130)/(43-70) 108/43  SpO2:  [88 %-95 %] 92 %    Physical Exam  Constitutional:       General: She is not in acute distress.  HENT:      Head: Normocephalic and atraumatic.      Right Ear: Tympanic membrane normal.      Left Ear: Tympanic membrane normal.      Nose: Nose normal.      Mouth/Throat:      Mouth: Mucous membranes are moist.   Eyes:      Extraocular Movements: Extraocular movements intact.      Pupils: Pupils are equal, round, and reactive to light.      Comments: Blind   Cardiovascular:      Rate and Rhythm: Normal rate and regular rhythm.      Pulses: Normal pulses.   Pulmonary:      Effort: Pulmonary effort is normal. No respiratory distress.      Comments: Decrease breathe sound B/L  Abdominal:      General: Abdomen is flat. There is no distension.   Musculoskeletal:      Cervical back: Normal range of motion.      Right lower leg: No edema.      Left lower leg: No edema.   Skin:     General: Skin is warm.   Neurological:      Mental Status: She is alert. She is disoriented.      Gait: Gait abnormal.      Comments: Moving allextremities   Psychiatric:         Mood and Affect: Mood normal.         Fluids    Intake/Output Summary (Last 24 hours) at 7/29/2022 1126  Last data filed at 7/29/2022 1000  Gross per 24 hour   Intake 680 ml   Output --   Net 680 ml       Laboratory  Recent Labs     07/27/22 1942   WBC 9.2   RBC 4.53   HEMOGLOBIN 14.0   HEMATOCRIT 41.4   MCV 91.4   MCH 30.9   MCHC 33.8   RDW 43.1   PLATELETCT 193   MPV 10.1     Recent Labs     07/27/22 1942   SODIUM 136   POTASSIUM 4.6   CHLORIDE 103   CO2 23   GLUCOSE 161*   BUN 16   CREATININE 0.49*   CALCIUM 8.9                   Imaging  CT-CTA CHEST PULMONARY ARTERY W/ RECONS   Final Result         1.  No pulmonary embolus appreciated.   2.  Atherosclerosis and atherosclerotic coronary  artery disease.      DX-CHEST-PORTABLE (1 VIEW)   Final Result         1.  Bilateral basilar atelectasis, no focal infiltrate           Assessment/Plan  * Respiratory failure (HCC)- (present on admission)  Assessment & Plan  Likely in setting of Atelectasis    Encourage Incentive spirometry   CTA Chest negative for PE. Covid/RSV/Flu negative  RT/02 protocol   Wean oxygen as tolerated and check home oxygen eval on 7/28 and set up if indicated.       Acute cystitis without hematuria  Assessment & Plan  UA consistent with UTI  Started on 5 days course of Nitrofurantion        Blindness  Assessment & Plan  Bilateral   Fall precautions     Other hyperlipidemia- (present on admission)  Assessment & Plan  Continue with statin    Primary hypertension- (present on admission)  Assessment & Plan  Continue lisinopril 40 mg daily and Toprol 25 mg    Diabetes mellitus type II, uncontrolled (HCC)- (present on admission)  Assessment & Plan  Sliding-scale with Accu-Cheks and hypoglycemia protocol.  Hold empagliflozin at this time       VTE prophylaxis: SCDs/TEDs and Xarelto 10 mg daily as prophylaxis    I have performed a physical exam and reviewed and updated ROS and Plan today (7/29/2022). In review of yesterday's note (7/28/2022), there are no changes except as documented above.

## 2022-07-30 LAB
GLUCOSE BLD STRIP.AUTO-MCNC: 132 MG/DL (ref 65–99)
GLUCOSE BLD STRIP.AUTO-MCNC: 202 MG/DL (ref 65–99)
GLUCOSE BLD STRIP.AUTO-MCNC: 97 MG/DL (ref 65–99)
GLUCOSE BLD STRIP.AUTO-MCNC: 99 MG/DL (ref 65–99)

## 2022-07-30 PROCEDURE — A9270 NON-COVERED ITEM OR SERVICE: HCPCS | Performed by: STUDENT IN AN ORGANIZED HEALTH CARE EDUCATION/TRAINING PROGRAM

## 2022-07-30 PROCEDURE — 99232 SBSQ HOSP IP/OBS MODERATE 35: CPT | Performed by: STUDENT IN AN ORGANIZED HEALTH CARE EDUCATION/TRAINING PROGRAM

## 2022-07-30 PROCEDURE — 700102 HCHG RX REV CODE 250 W/ 637 OVERRIDE(OP): Performed by: STUDENT IN AN ORGANIZED HEALTH CARE EDUCATION/TRAINING PROGRAM

## 2022-07-30 PROCEDURE — 94760 N-INVAS EAR/PLS OXIMETRY 1: CPT

## 2022-07-30 PROCEDURE — 770006 HCHG ROOM/CARE - MED/SURG/GYN SEMI*

## 2022-07-30 PROCEDURE — 97162 PT EVAL MOD COMPLEX 30 MIN: CPT

## 2022-07-30 PROCEDURE — 97165 OT EVAL LOW COMPLEX 30 MIN: CPT

## 2022-07-30 PROCEDURE — 82962 GLUCOSE BLOOD TEST: CPT | Mod: 91

## 2022-07-30 RX ADMIN — NITROFURANTOIN MONOHYDRATE/MACROCRYSTALLINE 100 MG: 25; 75 CAPSULE ORAL at 18:07

## 2022-07-30 RX ADMIN — NITROFURANTOIN MONOHYDRATE/MACROCRYSTALLINE 100 MG: 25; 75 CAPSULE ORAL at 08:47

## 2022-07-30 RX ADMIN — DORZOLAMIDE HCL 1 DROP: 20 SOLUTION/ DROPS OPHTHALMIC at 21:22

## 2022-07-30 RX ADMIN — DORZOLAMIDE HCL 1 DROP: 20 SOLUTION/ DROPS OPHTHALMIC at 06:49

## 2022-07-30 RX ADMIN — LATANOPROST 1 DROP: 50 SOLUTION OPHTHALMIC at 21:22

## 2022-07-30 RX ADMIN — RIVAROXABAN 10 MG: 10 TABLET, FILM COATED ORAL at 18:07

## 2022-07-30 RX ADMIN — SIMVASTATIN 5 MG: 20 TABLET, FILM COATED ORAL at 21:21

## 2022-07-30 RX ADMIN — LISINOPRIL 40 MG: 20 TABLET ORAL at 06:48

## 2022-07-30 RX ADMIN — METOPROLOL SUCCINATE 25 MG: 25 TABLET, EXTENDED RELEASE ORAL at 06:49

## 2022-07-30 ASSESSMENT — COGNITIVE AND FUNCTIONAL STATUS - GENERAL
MOVING FROM LYING ON BACK TO SITTING ON SIDE OF FLAT BED: UNABLE
TOILETING: A LITTLE
WALKING IN HOSPITAL ROOM: A LITTLE
MOBILITY SCORE: 19
HELP NEEDED FOR BATHING: A LITTLE
SUGGESTED CMS G CODE MODIFIER DAILY ACTIVITY: CJ
CLIMB 3 TO 5 STEPS WITH RAILING: A LITTLE
SUGGESTED CMS G CODE MODIFIER MOBILITY: CK
DAILY ACTIVITIY SCORE: 21
DRESSING REGULAR LOWER BODY CLOTHING: A LITTLE

## 2022-07-30 ASSESSMENT — GAIT ASSESSMENTS
DISTANCE (FEET): 50
ASSISTIVE DEVICE: FRONT WHEEL WALKER
GAIT LEVEL OF ASSIST: CONTACT GUARD ASSIST
DEVIATION: BRADYKINETIC;SHUFFLED GAIT

## 2022-07-30 ASSESSMENT — ACTIVITIES OF DAILY LIVING (ADL): TOILETING: INDEPENDENT

## 2022-07-30 NOTE — THERAPY
Physical Therapy   Initial Evaluation     Patient Name: Sarah Marie  Age:  82 y.o., Sex:  female  Medical Record #: [id number]  Today's Date: 7/30/2022     Precautions  Precautions: Fall Risk  Comments: legally blind    Assessment  Pt presents with impaired activity tolerance and strength associated with c/c of generalized weakness in setting of HTN, DM and complete blindness. Pt is most limited by safety insight, feels she can go home without assist and feels she is at baseline; needed assist to stand and cues to attend to 02, pt was able to maintain sp02 above 88% with ambulation and would probably do best without adding an 02 line given her blindness and her accessibility of her home. Pt is refusing placement and home health but more than likely needs a more supportive environment than her home given her insight into her current defiicts, would recommend SNF with follow up home health for set up of home unless dtr wishes to provide IADL support. Will follow.     Plan    Recommend Physical Therapy 3 times per week until therapy goals are met for the following treatments:  Bed Mobility, Equipment, Gait Training, Manual Therapy, Neuro Re-Education / Balance, Self Care/Home Evaluation, Sensory Integration Techniques, Stair Training, Therapeutic Activities, and Therapeutic Exercises       Abridged Subjective/Objective     07/30/22 1215   Prior Living Situation   Prior Services None   Housing / Facility 1 Story House   Steps Into Home 3   Rail Left Rail  (Steps into Home)   Bathroom Set up Walk In Shower;Shower Chair;Grab Bars   Equipment Owned 4-Wheel Walker   Lives with - Patient's Self Care Capacity Alone and Able to Care For Self   Comments denies falls or prior issues; wishe to go home feels she can manage; states she would wear 02 if she needed it but does not need   Prior Level of Functional Mobility   Bed Mobility Independent   Transfer Status Independent   Ambulation Independent   Distance Ambulation (Feet)    (household)   Assistive Devices Used 4-Wheel Walker   Stairs Independent   Cognition    Cognition / Consciousness X   Level of Consciousness Alert   Comments pleasant cooperative; poor insight/overestimates abilities;   Passive ROM Lower Body   Passive ROM Lower Body WDL   Comments stiff but functional   Strength Lower Body   Lower Body Strength  X   Gross Strength Generalized Weakness, Equal Bilaterally   Sensation Lower Body   Lower Extremity Sensation   WDL   Comments denies t/n   Balance Assessment   Sitting Balance (Static) Fair +   Sitting Balance (Dynamic) Fair   Standing Balance (Static) Fair   Standing Balance (Dynamic) Fair -   Weight Shift Sitting Good   Weight Shift Standing Fair   Comments UE support in sitting/standing; no loss of balance in moving environment   Gait Analysis   Gait Level Of Assist Contact Guard Assist   Assistive Device Front Wheel Walker   Distance (Feet) 50   # of Times Distance was Traveled 1   Deviation Bradykinetic;Shuffled Gait   Weight Bearing Status full   Vision Deficits Impacting Mobility blind, sees only complete black, no shapes or movement   Comments distance limited by pt, needing gauidance of FWW due to vision, reports this distance will get her around her home;   Bed Mobility    Supine to Sit   (NT, sitting in chair pre)   Sit to Supine Standby Assist  (increased time; increased cueign)   Functional Mobility   Sit to Stand Minimal Assist  (from chair wiht FWW)   Bed, Chair, Wheelchair Transfer Standby Assist  (with FWW)   Edema / Skin Assessment   Edema / Skin  X   Comments diffuse skin breakdown on legs   Patient / Family Goals    Patient / Family Goal #1 to improve activity tolerance   Short Term Goals    Short Term Goal # 1 Pt will perform supine<>sit from flat HOB/no railing with supervision within 6 visits to ensure independent mobility at home.   Short Term Goal # 2 Pt will perform sit<>stand with FWW and supervision wihtin 6 visits to ensure independent  mobiltiy at home.   Short Term Goal # 3 Pt will ascend/descend 3 stairs with B UE support and supervision within 6 visits to ensure independnet moiblity at home.   Education Group   Role of Physical Therapist Patient Response Patient;Acceptance;Demonstration;Explanation;Verbal Demonstration;Action Demonstration   Use of Assistive Device Patient Response Patient;Acceptance;Demonstration;Explanation;Action Demonstration;Verbal Demonstration

## 2022-07-30 NOTE — PROGRESS NOTES
Hospital Medicine Daily Progress Note    Date of Service  7/30/2022    Chief Complaint  Sarah Marie is a 82 y.o. female admitted 7/27/2022 with generalized weakness     Hospital Course  Ms. Marie is a 82 y.o. female past medical history of legally blind, hypertension, hyperlipidemia, type 2 diabetes mellitus who presented 7/27/2022 with chief complaint of leg pain.  Daughter also reports blood pressure has been elevated with associated dizziness and nausea.  No relieving or exacerbating factors are noted.  She is vaccinated for COVID-19.  She denies any blood thinners.   In the ER, she was noted to be hypoxic 87% on room air.  Further work-up revealed elevated D-dimer for which CT of the chest was performed.   CTA chest was negative for PE favoring atlecatasis .  Given that she is requiring oxygen she will be hospitalized for oxygen supplementation and further work-up.  Interval Problem Update  7/29/2022  On 2L oxygen saturating above 90%   labs unremarkable   Need home 02 evaluation  Continue incentive spirometer  Added  nitrofurantoin for UTI  Need PT/OT evaluation       7/30/2022  Vitals remained stable.  Requiring 2 L oxygen saturating over 90  Labs reviewed.  Unremarkable unremarkable  Elevated blood glucose level.  On insulin regimen  Patient on empagliflozin at home.    Patient states she lives by herself and be doing her daily activities.  She is noted to be unsteady while standing  Need PT/OT evaluation.  Likely need placement  Home O2 evaluation pending.  Discussed plan with  RN  I have discussed this patient's plan of care and discharge plan at IDT rounds today with Case Management, Nursing, Nursing leadership, and other members of the IDT team.     Consultants/Specialty  N/A     Code Status  Full Code     Disposition  Patient is not medically cleared for discharge.   Anticipate discharge to to home with close outpatient follow-up.  I have placed the appropriate orders for post-discharge  needs.     Review of Systems  Review of Systems   Constitutional: Positive for malaise/fatigue. Negative for fever.   HENT: Negative for hearing loss.    Eyes: Negative for blurred vision.   Respiratory: Negative for cough and shortness of breath.    Cardiovascular: Negative for chest pain.   Gastrointestinal: Negative for nausea and vomiting.   Musculoskeletal: Positive for falls.   Skin: Negative for rash.   Endo/Heme/Allergies: Does not bruise/bleed easily.         Physical Exam  Temp:  [36.1 °C (97 °F)-36.7 °C (98 °F)] 36.5 °C (97.7 °F)  Pulse:  [] 92  Resp:  [16-19] 16  BP: (105-130)/(43-70) 108/43  SpO2:  [88 %-95 %] 92 %     Physical Exam  Constitutional:       General: She is not in acute distress.  HENT:      Head: Normocephalic and atraumatic.      Right Ear: Tympanic membrane normal.      Left Ear: Tympanic membrane normal.      Nose: Nose normal.      Mouth/Throat:      Mouth: Mucous membranes are moist.   Eyes:      Extraocular Movements: Extraocular movements intact.      Pupils: Pupils are equal, round, and reactive to light.      Comments: Blind   Cardiovascular:      Rate and Rhythm: Normal rate and regular rhythm.      Pulses: Normal pulses.   Pulmonary:      Effort: Pulmonary effort is normal. No respiratory distress.      Comments: Decrease breathe sound B/L  Abdominal:      General: Abdomen is flat. There is no distension.   Musculoskeletal:      Cervical back: Normal range of motion.      Right lower leg: No edema.      Left lower leg: No edema.   Skin:     General: Skin is warm.   Neurological:      Mental Status: She is alert. She is disoriented.      Gait: Gait abnormal.      Comments: Moving allextremities   Psychiatric:         Mood and Affect: Mood normal.      Fluids    Intake/Output Summary (Last 24 hours) at 7/30/2022 1400  Last data filed at 7/30/2022 1111  Gross per 24 hour   Intake 1200 ml   Output --   Net 1200 ml       Laboratory  Recent Labs     07/27/22 1942   WBC 9.2    RBC 4.53   HEMOGLOBIN 14.0   HEMATOCRIT 41.4   MCV 91.4   MCH 30.9   MCHC 33.8   RDW 43.1   PLATELETCT 193   MPV 10.1     Recent Labs     07/27/22 1942   SODIUM 136   POTASSIUM 4.6   CHLORIDE 103   CO2 23   GLUCOSE 161*   BUN 16   CREATININE 0.49*   CALCIUM 8.9                   Imaging  CT-CTA CHEST PULMONARY ARTERY W/ RECONS   Final Result         1.  No pulmonary embolus appreciated.   2.  Atherosclerosis and atherosclerotic coronary artery disease.      DX-CHEST-PORTABLE (1 VIEW)   Final Result         1.  Bilateral basilar atelectasis, no focal infiltrate           Assessment/Plan  * Respiratory failure (HCC)- (present on admission)  Assessment & Plan  Likely in setting of Atelectasis    Encourage Incentive spirometry   CTA Chest negative for PE. Covid/RSV/Flu negative  RT/02 protocol   Wean oxygen as tolerated and check home oxygen eval on 7/28 and set up if indicated.       Acute cystitis without hematuria  Assessment & Plan  UA consistent with UTI  Started on 5 days course of Nitrofurantion        Blindness  Assessment & Plan  Bilateral   Fall precautions     Other hyperlipidemia- (present on admission)  Assessment & Plan  Continue with statin    Primary hypertension- (present on admission)  Assessment & Plan  Continue lisinopril 40 mg daily and Toprol 25 mg    Diabetes mellitus type II, uncontrolled (HCC)- (present on admission)  Assessment & Plan  Sliding-scale with Accu-Cheks and hypoglycemia protocol.  Hold empagliflozin at this time         VTE prophylaxis: SCDs/TEDs and Xarelto 10 mg daily as prophylaxis    I have performed a physical exam and reviewed and updated ROS and Plan today (7/30/2022). In review of yesterday's note (7/29/2022), there are no changes except as documented above.

## 2022-07-30 NOTE — THERAPY
"Occupational Therapy   Initial Evaluation     Patient Name: Sarah Marie  Age:  82 y.o., Sex:  female  Medical Record #: [id number]  Today's Date: 7/30/2022     Precautions  Precautions: (P) Fall Risk  Comments: (P) Legally blind    Assessment    Patient is 82 y.o. female admitted for leg pain, generalized weakness, HTN; pt with PMH of DMII, HTN, legally blind. Pt normally independent with functional mobility and ADLs at baseline living alone in a SL[facility] has friends/family who assist with IADLs. Pt states familiar environment of her home easy to navigate, denies any falls. Pt able to complete functional mobility and ADLs with CGA due to unfamily environment and legal blind status. Anticipate pt is at her functional baseline, refusing any services at discharge would likely benefit from a more supportive environment due to chronic visual problems.     Plan    Recommend Occupational Therapy for Evaluation only.    DC Equipment Recommendations: (P) None  Discharge Recommendations: (P) Anticipate that the patient will have no further occupational therapy needs after discharge from the hospital     Subjective    \"I do just fine in my home, I know where everything is\"     Objective       07/30/22 1204   Prior Living Situation   Prior Services None   Housing / Facility 1 Story House   Bathroom Set up Walk In Shower;Shower Chair;Grab Bars   Equipment Owned 4-Wheel Walker;Tub / Shower Seat;Grab Bar(s) By Toilet;Grab Bar(s) In Tub / Shower   Lives with - Patient's Self Care Capacity Alone and Able to Care For Self   Prior Level of ADL Function   Self Feeding Independent   Grooming / Hygiene Independent   Bathing Independent   Dressing Independent   Toileting Independent   Precautions   Precautions Fall Risk   Comments Legally blind   Pain 0 - 10 Group   Therapist Pain Assessment Post Activity Pain Same as Prior to Activity;Nurse Notified   Cognition    Cognition / Consciousness WDL   Level of Consciousness Alert   Active ROM Upper " Body   Active ROM Upper Body  WDL   Dominant Hand Right   Strength Upper Body   Upper Body Strength  WDL   Sensation Upper Body   Upper Extremity Sensation  WDL   Upper Body Muscle Tone   Upper Body Muscle Tone  WDL   Neurological Concerns   Neurological Concerns No   Coordination Upper Body   Coordination WDL   Balance Assessment   Sitting Balance (Static) Fair   Sitting Balance (Dynamic) Fair   Standing Balance (Static) Fair   Standing Balance (Dynamic) Poor +   Weight Shift Sitting Fair   Weight Shift Standing Fair   Comments w/ FWW   Bed Mobility    Sit to Supine Supervised   Scooting Supervised   Rolling Supervised   ADL Assessment   Grooming Supervision;Seated   Upper Body Dressing Supervision   Lower Body Dressing Supervision   Toileting Supervision   How much help from another person does the patient currently need...   Putting on and taking off regular lower body clothing? 3   Bathing (including washing, rinsing, and drying)? 3   Toileting, which includes using a toilet, bedpan, or urinal? 3   Putting on and taking off regular upper body clothing? 4   Taking care of personal grooming such as brushing teeth? 4   Eating meals? 4   6 Clicks Daily Activity Score 21   Functional Mobility   Sit to Stand Supervised   Bed, Chair, Wheelchair Transfer Contact Guard Assist   Toilet Transfers Contact Guard Assist   Transfer Method Stand Step   Mobility Up in chair after BSC with RN, mobility in room, back to bed   Comments w/ FWW   Visual Perception   Visual Perception  X   Visual Acuity Blind Bilaterally   Activity Tolerance   Sitting in Chair 5 min   Sitting Edge of Bed 5 min   Standing 5 min   Education Group   Education Provided Role of Occupational Therapist   Role of Occupational Therapist Patient Response Patient;Acceptance;Explanation   Problem List   Problem List None   Anticipated Discharge Equipment and Recommendations   DC Equipment Recommendations None   Discharge Recommendations Anticipate that the  patient will have no further occupational therapy needs after discharge from the hospital   Interdisciplinary Plan of Care Collaboration   IDT Collaboration with  Nursing;Physical Therapist   Patient Position at End of Therapy In Bed;Bed Alarm On;Call Light within Reach;Tray Table within Reach;Phone within Reach   Collaboration Comments RN updated

## 2022-07-31 ENCOUNTER — PHARMACY VISIT (OUTPATIENT)
Dept: PHARMACY | Facility: MEDICAL CENTER | Age: 82
End: 2022-07-31
Payer: MEDICARE

## 2022-07-31 VITALS
DIASTOLIC BLOOD PRESSURE: 66 MMHG | HEART RATE: 89 BPM | OXYGEN SATURATION: 94 % | RESPIRATION RATE: 16 BRPM | BODY MASS INDEX: 29.87 KG/M2 | HEIGHT: 66 IN | TEMPERATURE: 98.3 F | WEIGHT: 185.85 LBS | SYSTOLIC BLOOD PRESSURE: 127 MMHG

## 2022-07-31 LAB
GLUCOSE BLD STRIP.AUTO-MCNC: 103 MG/DL (ref 65–99)
GLUCOSE BLD STRIP.AUTO-MCNC: 116 MG/DL (ref 65–99)

## 2022-07-31 PROCEDURE — 99239 HOSP IP/OBS DSCHRG MGMT >30: CPT | Performed by: STUDENT IN AN ORGANIZED HEALTH CARE EDUCATION/TRAINING PROGRAM

## 2022-07-31 PROCEDURE — 700102 HCHG RX REV CODE 250 W/ 637 OVERRIDE(OP): Performed by: STUDENT IN AN ORGANIZED HEALTH CARE EDUCATION/TRAINING PROGRAM

## 2022-07-31 PROCEDURE — A9270 NON-COVERED ITEM OR SERVICE: HCPCS | Performed by: STUDENT IN AN ORGANIZED HEALTH CARE EDUCATION/TRAINING PROGRAM

## 2022-07-31 PROCEDURE — 82962 GLUCOSE BLOOD TEST: CPT

## 2022-07-31 PROCEDURE — RXMED WILLOW AMBULATORY MEDICATION CHARGE: Performed by: STUDENT IN AN ORGANIZED HEALTH CARE EDUCATION/TRAINING PROGRAM

## 2022-07-31 RX ORDER — EMPAGLIFLOZIN 25 MG/1
25 TABLET, FILM COATED ORAL DAILY
Qty: 30 TABLET | Refills: 0 | Status: SHIPPED | OUTPATIENT
Start: 2022-07-31 | End: 2022-08-30

## 2022-07-31 RX ORDER — DORZOLAMIDE HCL 20 MG/ML
1 SOLUTION/ DROPS OPHTHALMIC 2 TIMES DAILY
Qty: 10 ML | Refills: 0 | Status: SHIPPED | OUTPATIENT
Start: 2022-07-31 | End: 2022-08-30

## 2022-07-31 RX ORDER — LISINOPRIL 40 MG/1
40 TABLET ORAL DAILY
Qty: 30 TABLET | Refills: 0 | Status: SHIPPED | OUTPATIENT
Start: 2022-08-01 | End: 2022-08-31

## 2022-07-31 RX ORDER — NITROFURANTOIN 25; 75 MG/1; MG/1
100 CAPSULE ORAL 2 TIMES DAILY WITH MEALS
Qty: 10 CAPSULE | Refills: 0 | Status: SHIPPED | OUTPATIENT
Start: 2022-07-31 | End: 2022-08-05

## 2022-07-31 RX ORDER — ALBUTEROL SULFATE 90 UG/1
2 AEROSOL, METERED RESPIRATORY (INHALATION) EVERY 6 HOURS PRN
Qty: 8.5 G | Refills: 30 | Status: SHIPPED | OUTPATIENT
Start: 2022-07-31 | End: 2023-04-05

## 2022-07-31 RX ADMIN — DORZOLAMIDE HCL 1 DROP: 20 SOLUTION/ DROPS OPHTHALMIC at 06:03

## 2022-07-31 RX ADMIN — LISINOPRIL 40 MG: 20 TABLET ORAL at 06:03

## 2022-07-31 RX ADMIN — NITROFURANTOIN MONOHYDRATE/MACROCRYSTALLINE 100 MG: 25; 75 CAPSULE ORAL at 09:21

## 2022-07-31 RX ADMIN — METOPROLOL SUCCINATE 25 MG: 25 TABLET, EXTENDED RELEASE ORAL at 06:03

## 2022-07-31 NOTE — DISCHARGE INSTRUCTIONS
Discharge Instructions per Herb Goodman M.D.    Follow-up with  primary care provider and pulmonology  Pulmonology referral sent      DIAGNOSIS:  Principal Problem:    Respiratory failure (HCC) POA: Yes  Active Problems:    Diabetes mellitus type II, uncontrolled (HCC) POA: Yes    Primary hypertension POA: Yes    Other hyperlipidemia POA: Yes    Blindness POA: Unknown    Acute cystitis without hematuria POA: Unknown  Resolved Problems:    * No resolved hospital problems. *      Return to ER if you develop any of the following symptoms fever, chills, weakness, not feeling well, rash, bleeding, blurred vision, palpitations, cough, pain in any part of your body, shortness of breath, nausea, vomiting, diarrhea, difficulty with urination, dizziness, headache, seizures, loss of consciousness or if you develop any new symptoms.

## 2022-07-31 NOTE — PROGRESS NOTES
Patient was discharged home. Daughter Emely came to pick her up. Oxygen was delivered and patient was taken out by wheel chair. Meds to Bed delivered. Daughter took all possessions with them. Patient transferred to vehicle without difficulty.

## 2022-07-31 NOTE — FACE TO FACE
"Face to Face Note  -  Durable Medical Equipment    Herb Goodman M.D. - NPI: [id number]  I certify that this patient is under my care and that they had a durable medical equipment(DME)face to face encounter by the clinical nurse specialist working collaboratively with me that meets the physician DME face-to-face encounter requirements with this patient on:    Date of encounter:   Patient:                    MRN:                       YOB: 2023  Sarah Marie  [id number]  1940     The encounter with the patient was in whole, or in part, for the following medical condition, which is the primary reason for durable medical equipment:  Other - atelactasis    I certify that, based on my findings, the following durable medical equipment is medically necessary:    Oxygen   HOME O2 Saturation Measurements:(Values must be present for Home Oxygen orders)  Room air sat at rest: 87  Room air sat with amb: 82  With liters of O2: 2, O2 sat at rest with O2: 96  With Liters of O2: 2, O2 sat with amb with O2 : 93  Is the patient mobile?: Yes  If patient feels more short of breath, they can go up to 6 liters per minute and contact healthcare provider.    Supporting Symptoms: The patient requires supplemental oxygen, as the following interventions have been tried with limited or no improvement: \"Bronchodilators and/or steroid inhalers  .    My Clinical findings support the need for the above equipment due to:  Hypoxia    "

## 2022-07-31 NOTE — DISCHARGE PLANNING
Order received for DME O2 set up. Choice obtained from pt for [facility], [facility], and [facility]. Choice form faxed to [facility]. Daughter to be called for discharge transportation once O2 set up time known.

## 2022-07-31 NOTE — PROGRESS NOTES
Received report from LADY Smith. Assumed care at 0700. This pt is A&O x3 . Patient and RN discussed plan of care, all questions answered. Labs noted. Call light in place, personal belongings available bed in lowest position, 3 bedrails up, bed alarm on, patient educated on importance of calling for assistance, hourly rounding in place. No additional needs at this time. VSS

## 2022-07-31 NOTE — DISCHARGE PLANNING
Received Choice form at [phone]  Agency/Facility Name: [facility]   Referral sent per Choice form @ 0595

## 2022-07-31 NOTE — CONSULTS
"Reason for PC Consult: Advance Care Planning    Consulted by: Herb Goodman MD    Assessment:  Hospital Course: 83yo lady BIB EMS and admitted through ED 7/27 with c/o nausea, weakness and leg pain; and requiring O2 as saturation 87% on RA. Elevated D-dimer - CTA chest negative for PE    Consults: none  Prior PC Consult: none    PMH: blindness, HTN, T@ DM, HLD, former smoker      Dyspnea: No- Pt was not using O2 at home prior to this admission and doesn't feel like she needs to now (but see saturation eval by RN).  Last BM: 07/28/22-    Pain: No- BLE discomfort \"getting better\" since admission  Depression: Mood appropriate for situation-      Spiritual:  Is Congregation or spirituality important for coping with this illness? Yes- discharging today, no time to for spiritual care visit  Has a  or spiritual provider visit been requested? No    Palliative Performance Scale: 50    Advanced Directive: None- Assisted to complete at this encounter though notary servcie unavailable prior to d/c. Reviewe following instructions for witnesses with pt's dtr over the phone. Pt named her dtr Emely Ras as her choice for DPOA-HC with no alternatives. Under Statements of Desires, patient selected #3, and #5, meaning that she would not want life-prolonging treatments to be provided if there was no reasonable hope of recovery or long-term survival. She would want her DPOA-HC to consider quality of life, relief of suffering, and preservation of function. Pt had additional wishes to die naturally/DNR and DNI/no machines.  DPOA:  -    POLST:No- Completed at this encounter for DNR, selective treatment/DNI, and no artificial nutrition. Signed by pt and MD, scanned to epic, original to go with pt at d/c.    Code Status: Full- Discussed at this encounter, pt stated that she would not want to have resuscitation attempted and would not want to be on machines - amenable to DNR/DNI code status.    Social:   Pt lives in [place], [place] near " "family and with a friend who stays with her (Drea) and her three dogs. Pt's dtr Rosie lives next door and her dtr Emely lives in [place]. Pt enjoys sitting outside - \"I can't do much because of being blind.\"    Outcome:  Consult received and EMR reviewed; case discussed with Dr. Goodman.    Introduced self and role of Palliative Care.  Assessed pt's understanding of her current medical status, overall health picture, and options for future care. Pt defined her acute situation as \"leg pain from my diabetes\" and enumerated her chronic issues of blindness, HTN and diabetes. Pt receives her medical care through the [facility].    Explored pt's values, beliefs, and preferences in order to identify GOC. See code and AD discussion/decisions above. Pt was very intent on wanting to return home today - \"I don't like being here and I do better at home..and the food is better.\"  Pt stated feeling \"safe\" at home and believes she has a strong support system. Pt's meds to beds were delivered (eye drops unavailable, to be called into [facility] pharmacy).     Active listening, reflection, reminiscing, validation & normalization, empathic support and therapeutic touch utilized throughout this encounter.  All questions answered.  PC contact information given. Acknowledged pt's courage in having this discussion. Sarah expressed appreciation for support. Pt gave permission to call her dtr Emely and review above which was done - Emely expressed appreciation for information and is on her way to  pt.    Discussed with/Updated: Dr. Goodman, primary RN Tenisha    Plan:  D/c home today with HH and O2.     Thank you for allowing Palliative Care to participate in this patient's care. Please feel free to call [phone] with any questions or concerns.  "

## 2022-07-31 NOTE — PROGRESS NOTES
Home medication list updated based on the medication list provided by the patient's daughter. Dr. Herb Goodman made aware. Also notified Dr. Goodman that the patient take Jardiance 25 mg tab PO Daily.

## 2022-07-31 NOTE — DISCHARGE SUMMARY
Discharge Summary    CHIEF COMPLAINT ON ADMISSION  Chief Complaint   Patient presents with   • Nausea     PT BIB EMS from home. PT states she has had nausea all day.   • Leg Pain     PT daughter reports PT legs seem more swollen last few days. PT states she has not noticed a difference in size. PT received 4 of Zofran by EMS and 30 of Toradol.         Reason for Admission  ems     Admission Date  7/27/2022    CODE STATUS  Full Code    HPI & HOSPITAL COURSE  Ms. Marie is a 82 y.o. female past medical history of legally blind, hypertension, hyperlipidemia, type 2 diabetes mellitus who presented 7/27/2022 with chief complaint of leg pain.  Daughter also reports blood pressure has been elevated with associated dizziness and nausea.  No relieving or exacerbating factors are noted.  She is vaccinated for COVID-19.  She denies any blood thinners.   In the ER, she was noted to be hypoxic 87% on room air.  Further work-up revealed elevated D-dimer for which CT of the chest was performed.   CTA chest was negative for PE favoring atlecatasis .  She was admitted for acute hypoxic respiratory failure.  During hospital course patient was treated with albuterol, incentive spirometry with minimal improvement in oxygen requirement.  Patient be discharged home with albuterol and home oxygen.  Advised her to follow-up with PCP and continue incentive spirometry.  Patient referred to pulmonology for outpatient spirometry   Or sleep study.  During hospital course patient remain  hemodynamically stable ,asymptomatic ,labs remain unremarkable .  Patient will be discharged with close follow up with PCP and pulmonology.  Advised for medicine compliance.Discharge plan was discussed with patient in details .  Patient agreed with discharge plan  and  all questions answered.    Therefore, she is discharged in good and stable condition to home with close outpatient follow-up.    The patient met 2-midnight criteria for an inpatient stay at the  time of discharge.    Discharge Date  7/31/2022        DISCHARGE DIAGNOSES  Principal Problem:    Respiratory failure (HCC) POA: Yes  Active Problems:    Diabetes mellitus type II, uncontrolled (HCC) POA: Yes    Primary hypertension POA: Yes    Other hyperlipidemia POA: Yes    Blindness POA: Unknown    Acute cystitis without hematuria POA: Unknown  Resolved Problems:    * No resolved hospital problems. *      FOLLOW UP  No future appointments.  No follow-up provider specified.    MEDICATIONS ON DISCHARGE     Medication List      START taking these medications      Instructions   albuterol 108 (90 Base) MCG/ACT Aers inhalation aerosol   Inhale 2 Puffs every 6 hours as needed for Shortness of Breath.  Dose: 2 Puff     Jardiance 25 MG Tabs  Generic drug: Empagliflozin   Take 25 mg by mouth every day for 30 days.  Dose: 25 mg     nitrofurantoin 100 MG Caps  Commonly known as: MACROBID   Take 1 Capsule by mouth 2 times a day with meals for 5 days.  Dose: 100 mg        CONTINUE taking these medications      Instructions   alendronate 70 MG Tabs  Commonly known as: FOSAMAX   Take 70 mg by mouth every 7 days.  Dose: 70 mg     dorzolamide 2 % Soln  Commonly known as: TRUSOPT   Administer 1 Drop into both eyes 2 times a day for 30 days.  Dose: 1 Drop     ICAPS AREDS 2 PO   Take 1 Tablet by mouth 2 times a day.  Dose: 1 Tablet     latanoprost 0.005 % Soln  Commonly known as: XALATAN   Place 1 Drop in both eyes every evening.  Dose: 1 Drop     lisinopril 40 MG tablet  Start taking on: August 1, 2022  Commonly known as: PRINIVIL   Take 1 Tablet by mouth every day for 30 days.  Dose: 40 mg     metoprolol SR 25 MG Tb24  Commonly known as: TOPROL XL   Take 25 mg by mouth every day. Indications: Daughter as informant  Dose: 25 mg     simvastatin 5 MG Tabs  Commonly known as: ZOCOR   Take 5 mg by mouth every day.  Dose: 5 mg        STOP taking these medications    ciprofloxacin 250 MG Tabs  Commonly known as: CIPRO             Allergies  Allergies   Allergen Reactions   • Clindamycin Rash         • Codeine Vomiting   • Diclofenac      Gi problems     • Hydrocodone Nausea   • Iodine Unspecified     Skin irritation     • Metformin      dairrhea   • Omnicef [Cefdinir] Rash     rash   • Oxycodone Vomiting   • Pcn [Penicillins] Rash     As  A child         DIET  Orders Placed This Encounter   Procedures   • Diet Order Diet: Consistent CHO (Diabetic)     Standing Status:   Standing     Number of Occurrences:   1     Order Specific Question:   Diet:     Answer:   Consistent CHO (Diabetic) [4]       ACTIVITY  As tolerated.  Weight bearing as tolerated        LABORATORY  Lab Results   Component Value Date    SODIUM 136 07/27/2022    POTASSIUM 4.6 07/27/2022    CHLORIDE 103 07/27/2022    CO2 23 07/27/2022    GLUCOSE 161 (H) 07/27/2022    BUN 16 07/27/2022    CREATININE 0.49 (L) 07/27/2022    CREATININE 0.7 02/21/2007        Lab Results   Component Value Date    WBC 9.2 07/27/2022    HEMOGLOBIN 14.0 07/27/2022    HEMATOCRIT 41.4 07/27/2022    PLATELETCT 193 07/27/2022        Total time of the discharge process exceeds 37  minutes.

## 2022-07-31 NOTE — PROGRESS NOTES
Nurse sat patient up in bed and removed o2. After 2 mins patient dropped to 87% on room air. Nurse ambulated patient around room, (patient is blind) and O2 dropped to 82%. O2 reapplied and O2 came back up to 93% with 2L. When patient got back into bed and laid down O2 was 95% with 2L

## 2022-07-31 NOTE — CARE PLAN
Problem: Knowledge Deficit - Standard  Goal: Patient and family/care givers will demonstrate understanding of plan of care, disease process/condition, diagnostic tests and medications  Outcome: Progressing     Problem: Fall Risk  Goal: Patient will remain free from falls  Outcome: Not Met   The patient is Stable - Low risk of patient condition declining or worsening    Shift Goals  Clinical Goals: safety  Patient Goals: sleep  Family Goals: get patient hpme    Progress made toward(s) clinical / shift goals:  no falls    Patient is not progressing towards the following goals:      Problem: Fall Risk  Goal: Patient will remain free from falls  Outcome: Not Met     
Patient alert oriented x 3 on 2L of oxygen. Patient remain free from any fall at this time, tele sitter and bed alarm in place. No acute distress noted, patient call light within reach.      
The patient is Stable - Low risk of patient condition declining or worsening    Shift Goals  Clinical Goals: Patient will not have any falls this shift  Patient Goals: Patient will get to go home  Family Goals: get patient hpme    Progress made toward(s) clinical / shift goals:  Patient has all safety precautions in place          
The patient is Stable - Low risk of patient condition declining or worsening    Shift Goals  Clinical Goals: Pt. will remain free from falls, keep on RA status    Progress made toward(s) clinical / shift goals:  Pt. On RA status at this time, with sats at 93% on RA. Pt. Able to do Incentive spirometer with level up to 1000. Educated multiple times as pt. Forgets at times. Reiterated use of call light button for assistance. Telesitter established at this time,pt. Attempted 3 times to get out of bed without calling, pt. Is blind and had already previously fallen during the day.       Problem: Knowledge Deficit - Standard  Goal: Patient and family/care givers will demonstrate understanding of plan of care, disease process/condition, diagnostic tests and medications  Outcome: Progressing     Problem: Fall Risk  Goal: Patient will remain free from falls  Outcome: Progressing     
The patient is Stable - Low risk of patient condition declining or worsening    Shift Goals  Clinical Goals: safety, comfort  Patient Goals: sleep  Family Goals: get patient hpme    Progress made toward(s) clinical / shift goals:  Patient remain safe and comfortable throughout the whole shift.    Patient is not progressing towards the following goals:      
The patient is Stable - Low risk of patient condition declining or worsening    Shift Goals  Clinical Goals: safety,comfort, no SOB  Patient Goals: rest    Progress made toward(s) clinical / shift goals:  Patient denies SOB while at rest and while ambulating.Patient remain safe and comfortable throughout the whole shift.    Patient is not progressing towards the following goals:      Problem: Knowledge Deficit - Standard  Goal: Patient and family/care givers will demonstrate understanding of plan of care, disease process/condition, diagnostic tests and medications  Outcome: Not Met     
[facility]

## 2022-08-08 ENCOUNTER — HOSPITAL ENCOUNTER (EMERGENCY)
Facility: MEDICAL CENTER | Age: 82
End: 2022-08-08
Attending: EMERGENCY MEDICINE
Payer: MEDICARE

## 2022-08-08 ENCOUNTER — APPOINTMENT (OUTPATIENT)
Dept: RADIOLOGY | Facility: MEDICAL CENTER | Age: 82
End: 2022-08-08
Attending: EMERGENCY MEDICINE
Payer: MEDICARE

## 2022-08-08 VITALS
BODY MASS INDEX: 29.73 KG/M2 | HEIGHT: 66 IN | TEMPERATURE: 98.3 F | DIASTOLIC BLOOD PRESSURE: 70 MMHG | SYSTOLIC BLOOD PRESSURE: 148 MMHG | RESPIRATION RATE: 16 BRPM | WEIGHT: 185 LBS | OXYGEN SATURATION: 95 % | HEART RATE: 91 BPM

## 2022-08-08 DIAGNOSIS — S09.8XXA BLUNT HEAD TRAUMA, INITIAL ENCOUNTER: ICD-10-CM
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
## 2022-08-08 PROCEDURE — 99284 EMERGENCY DEPT VISIT MOD MDM: CPT | Mod: 25

## 2022-08-08 PROCEDURE — 70450 CT HEAD/BRAIN W/O DYE: CPT | Mod: MG

## 2022-08-08 ASSESSMENT — FIBROSIS 4 INDEX: FIB4 SCORE: 2.44

## 2022-08-08 NOTE — ED TRIAGE NOTES
Chief Complaint   Patient presents with   • GLF     Patient brought in by [employer] after a mechanical ground level fall. Patient reports falling and hitting the back of her head while walking to the bathroom. Patient was found in the kitchen by her caregiver. -LOC -thinners. GCS 14 at baseline.

## 2022-08-08 NOTE — ED PROVIDER NOTES
ED Provider Note    Scribed for Scotty Douglas M.D. by Kelsey Miner. 8/8/2022  4:13 AM    Primary care provider: NORMAN Johnson  Means of arrival: EMS  History obtained from: EMS and Patient  History limited by: None noted    CHIEF COMPLAINT  Chief Complaint   Patient presents with    GLF     Patient brought in by [employer] after a mechanical ground level fall. Patient reports falling and hitting the back of her head while walking to the bathroom. Patient was found in the kitchen by her caregiver. -LOC -thinners. GCS 14 at baseline.        HPI  Sarah Marie is a 82 y.o. female who presents to the Emergency Department as a code TBI for evaluation following a ground level fall at 2:30 AM today. Patient states she was going to the restroom when she fell, however EMS reports finding the patient down in the kitchen. She reports she was too weak to get up following her fall. She endorses associated mild head pain, but denies any loss of consciousness, alcohol intoxication, neck, arm leg, hip or back pain. EMS notes the patient does not take any blood thinners. She is typically on oxygen at night.     REVIEW OF SYSTEMS  Pertinent positives include head pain.   Pertinent negatives include no loss of consciousness, alcohol intoxication, neck, arm leg, hip or back pain.    All other systems reviewed and negative. See HPI for further details.       PAST MEDICAL HISTORY   has a past medical history of Angina, Arthritis, Backpain, CATARACT, Diabetes, Diverticulosis, Hemorrhagic disorder (HCC), High cholesterol, Hypertension, Stroke (HCC), and Unspecified urinary incontinence.    SURGICAL HISTORY   has a past surgical history that includes other abdominal surgery; gyn surgery; other; other; other orthopedic surgery (1997); septoplasty (12/10/2015); and nasal endoscopy (Bilateral, 12/10/2015).    SOCIAL HISTORY  Social History     Tobacco Use    Smoking status: Former Smoker    Smokeless tobacco: Never Used   Substance  "Use Topics    Alcohol use: No    Drug use: No      Social History     Substance and Sexual Activity   Drug Use No       FAMILY HISTORY  History reviewed. No pertinent family history.    CURRENT MEDICATIONS  Home Medications    **Home medications have not yet been reviewed for this encounter**         ALLERGIES  Allergies   Allergen Reactions    Clindamycin Rash          Codeine Vomiting    Diclofenac      Gi problems      Hydrocodone Nausea    Iodine Unspecified     Skin irritation      Metformin      dairrhea    Omnicef [Cefdinir] Rash     rash    Oxycodone Vomiting    Pcn [Penicillins] Rash     As  A child         PHYSICAL EXAM  VITAL SIGNS: BP (!) 141/71   Pulse 89   Temp 36.8 °C (98.3 °F) (Temporal)   Resp 16   Ht 1.676 m (5' 6\")   Wt 83.9 kg (185 lb)   SpO2 94%   BMI 29.86 kg/m²     Nursing note and vitals reviewed.  Constitutional: Well-developed and well-nourished. No distress.   HENT: Head is normocephalic and atraumatic. Oropharynx is clear and moist without exudate or erythema.   Eyes: chronic changes to vision, no acute changes  Cardiovascular: Normal rate and regular rhythm. No murmur heard. Normal radial pulses.  Pulmonary/Chest: Breath sounds normal. No wheezes or rales.   Abdominal: Soft and non-tender. No distention    Musculoskeletal: Extremities exhibit normal range of motion without edema or tenderness.   Neurological: Awake, alert and oriented to person, place, and time. No focal deficits noted.  Skin: Skin is warm and dry. No rash.   Psychiatric: Normal mood and affect. Appropriate for clinical situation.    DIAGNOSTIC STUDIES / PROCEDURES    RADIOLOGY  CT-HEAD W/O   Final Result         1.  No acute intracranial abnormality is identified, there are nonspecific white matter changes, commonly associated with small vessel ischemic disease.  Associated mild cerebral atrophy is noted.   2.  Atherosclerosis.           The radiologist's interpretation of all radiological studies have been " reviewed by me.    COURSE & MEDICAL DECISION MAKING  Nursing notes, VS, PMSFHx reviewed in chart.     4:13 AM - Patient seen and examined at bedside. Ordered CT-Head without to evaluate her symptoms. The patient present's today for evaluation following a ground level fall. She does not take any blood thinners. I informed her of the plan for imaging. Patient verbalizes understanding and agreement to this plan of care.     4:38 AM - Head Ct is normal. Patient will be discharged home. Patient verbalizes understanding and agreement to this plan of care.     Patient presents today after a fall.  No evidence of skull fracture or intracranial hemorrhage.  Otherwise the patient is atraumatic.      The patient will return for new or worsening symptoms and is stable at the time of discharge.    The patient is referred to a primary physician for blood pressure management, diabetic screening, and for all other preventative health concerns.    DISPOSITION:  Patient will be discharged home in stable condition.    FOLLOW UP:  NORMAN Johnson  [address] [place] [id number]  [phone]    Schedule an appointment as soon as possible for a visit       [facility], Emergency Dept  [address] [place] [id number] [phone]    If symptoms worsen    FINAL IMPRESSION  1. Fall, initial encounter    2. Blunt head trauma, initial encounter          Kelsey CHEATHAM (Breana), am scribing for, and in the presence of, Scotty Douglas M.D..    Electronically signed by: Kelsey Miner (Breana), 8/8/2022    Scotty CHEATHAM M.D. personally performed the services described in this documentation, as scribed by Kelsey Miner in my presence, and it is both accurate and complete.    The note accurately reflects work and decisions made by me.  Scotty Douglas M.D.  8/8/2022  11:23 AM

## 2022-08-08 NOTE — ED NOTES
Pt daughter, Emely notified that pt ready for discharge. Per Emely, she has a doctor's appointment and once she is done she can come get the pt.

## 2022-08-08 NOTE — ED NOTES
Rounded on pt. Pt asleep in gurney with unlabored breathing. Even rise and fall of chest visible. No signs of distress at this time. Call light within reach.

## 2022-10-27 ENCOUNTER — APPOINTMENT (OUTPATIENT)
Dept: SLEEP MEDICINE | Facility: MEDICAL CENTER | Age: 82
End: 2022-10-27
Payer: MEDICARE

## 2022-11-03 ENCOUNTER — PATIENT MESSAGE (OUTPATIENT)
Dept: HEALTH INFORMATION MANAGEMENT | Facility: OTHER | Age: 82
End: 2022-11-03

## 2023-04-03 ASSESSMENT — ENCOUNTER SYMPTOMS
CHEST TIGHTNESS: 0
WHEEZING: 0
SHORTNESS OF BREATH: 0
HEMOPTYSIS: 0
RESPIRATORY SYMPTOMS COMMENTS: NO
DYSPNEA AT REST: 0

## 2023-04-04 ENCOUNTER — OFFICE VISIT (OUTPATIENT)
Dept: SLEEP MEDICINE | Facility: MEDICAL CENTER | Age: 83
End: 2023-04-04
Attending: INTERNAL MEDICINE
Payer: MEDICARE

## 2023-04-04 VITALS
RESPIRATION RATE: 16 BRPM | OXYGEN SATURATION: 94 % | WEIGHT: 177 LBS | BODY MASS INDEX: 28.45 KG/M2 | SYSTOLIC BLOOD PRESSURE: 124 MMHG | HEART RATE: 74 BPM | DIASTOLIC BLOOD PRESSURE: 62 MMHG | HEIGHT: 66 IN

## 2023-04-04 DIAGNOSIS — J96.91 RESPIRATORY FAILURE WITH HYPOXIA, UNSPECIFIED CHRONICITY (HCC): ICD-10-CM

## 2023-04-04 PROCEDURE — 99203 OFFICE O/P NEW LOW 30 MIN: CPT | Performed by: INTERNAL MEDICINE

## 2023-04-04 PROCEDURE — 99212 OFFICE O/P EST SF 10 MIN: CPT | Performed by: INTERNAL MEDICINE

## 2023-04-04 RX ORDER — EMPAGLIFLOZIN 25 MG/1
TABLET, FILM COATED ORAL
COMMUNITY
Start: 2023-03-26

## 2023-04-04 ASSESSMENT — ENCOUNTER SYMPTOMS
WEAKNESS: 0
VOMITING: 0
HEARTBURN: 0
ABDOMINAL PAIN: 0
DIAPHORESIS: 0
SPEECH CHANGE: 0
EYE REDNESS: 0
HEMOPTYSIS: 0
DOUBLE VISION: 0
WHEEZING: 0
FOCAL WEAKNESS: 0
TREMORS: 0
FALLS: 0
ORTHOPNEA: 0
EYE PAIN: 0
FEVER: 0
NAUSEA: 0
SPUTUM PRODUCTION: 0
BACK PAIN: 0
CONSTIPATION: 0
STRIDOR: 0
PND: 0
PHOTOPHOBIA: 0
MYALGIAS: 0
SHORTNESS OF BREATH: 0
COUGH: 0
DIZZINESS: 0
SINUS PAIN: 0
DEPRESSION: 0
BLURRED VISION: 0
CLAUDICATION: 0
WEIGHT LOSS: 0
EYE DISCHARGE: 0
SORE THROAT: 0
PALPITATIONS: 0
NECK PAIN: 0
CHILLS: 0
DIARRHEA: 0
HEADACHES: 0

## 2023-04-04 ASSESSMENT — FIBROSIS 4 INDEX: FIB4 SCORE: 2.44

## 2023-04-04 NOTE — PROGRESS NOTES
Chief Complaint   Patient presents with    Establish Care     Referral Herb Goodman M.D. DX Acute respiratory failure with hypoxia (HCC)       HPI: This patient is a 82 y.o. female presenting for evaluation of hypoxemic respiratory failure. Pt is presenting with her daughter and care taker from nursing facility where she currently lives in [place]. PMHx includes CVA, DJD, DMII. She is smoked socially many years ago but per daughter <15 pk year hx and has  been tobacco free for over 30 years. She was hospitalized in July for leg pain, evaluated for DVT which was negative but noted to have mild hypoxemia. She was started on supplemental O2 and CTA showed no PE and no significant parenchymal lung disease. Per pt, daughter and care taker, the pt has no respiratory complaints. No SOB, no cough. Per care taker from nursing facility, the pt uses O2 at night only and does not appear to have issues during the day. Her SpO2 on RA today at rest is 94%. She does not have an official dx of dementia but spent time in memory care at her facility and daughter states she feels pt has progressed from dx of early or mild cognitive decline. Pt is DNR. Pt and daughter agree that extensive w/u is not desired. She has no respiratory concerns. O2 is managed by the attending MD at her facility.     Past Medical History:   Diagnosis Date    Angina     since  1970's    Arthritis     Backpain     L4 and L5    CATARACT     Diabetes     oral medication    Diverticulosis     Hemorrhagic disorder (HCC)     nose    High cholesterol     Hypertension     Stroke (HCC)     2004 left sided    Unspecified urinary incontinence        Social History     Socioeconomic History    Marital status:      Spouse name: Not on file    Number of children: Not on file    Years of education: Not on file    Highest education level: Not on file   Occupational History    Not on file   Tobacco Use    Smoking status: Former     Types: Cigarettes     Passive  exposure: Never    Smokeless tobacco: Never   Vaping Use    Vaping Use: Never used   Substance and Sexual Activity    Alcohol use: No    Drug use: No    Sexual activity: Not on file   Other Topics Concern    Not on file   Social History Narrative    Not on file     Social Determinants of Health     Financial Resource Strain: Not on file   Food Insecurity: Not on file   Transportation Needs: Not on file   Physical Activity: Not on file   Stress: Not on file   Social Connections: Not on file   Intimate Partner Violence: Not on file   Housing Stability: Not on file       History reviewed. No pertinent family history.    Current Outpatient Medications on File Prior to Visit   Medication Sig Dispense Refill    JARDIANCE 25 MG Tab       Multiple Vitamins-Minerals (ICAPS AREDS 2 PO) Take 1 Tablet by mouth 2 times a day.      alendronate (FOSAMAX) 70 MG Tab Take 70 mg by mouth every 7 days.      simvastatin (ZOCOR) 5 MG Tab Take 5 mg by mouth every day.      latanoprost (XALATAN) 0.005 % Solution Place 1 Drop in both eyes every evening.      albuterol 108 (90 Base) MCG/ACT Aero Soln inhalation aerosol Inhale 2 Puffs every 6 hours as needed for Shortness of Breath. 8.5 g 30    metoprolol SR (TOPROL XL) 25 MG TABLET SR 24 HR Take 25 mg by mouth every day. Indications: Daughter as informant       No current facility-administered medications on file prior to visit.       Allergies: Clindamycin, Codeine, Diclofenac, Hydrocodone, Iodine, Metformin, Omnicef [cefdinir], Oxycodone, and Pcn [penicillins]    ROS:   Review of Systems   Constitutional:  Negative for chills, diaphoresis, fever, malaise/fatigue and weight loss.   HENT:  Negative for congestion, ear discharge, ear pain, hearing loss, nosebleeds, sinus pain, sore throat and tinnitus.    Eyes:  Negative for blurred vision, double vision, photophobia, pain, discharge and redness.   Respiratory:  Negative for cough, hemoptysis, sputum production, shortness of breath, wheezing  "and stridor.    Cardiovascular:  Negative for chest pain, palpitations, orthopnea, claudication, leg swelling and PND.   Gastrointestinal:  Negative for abdominal pain, constipation, diarrhea, heartburn, nausea and vomiting.   Genitourinary:  Negative for dysuria and urgency.   Musculoskeletal:  Negative for back pain, falls, joint pain, myalgias and neck pain.   Skin:  Negative for itching and rash.   Neurological:  Negative for dizziness, tremors, speech change, focal weakness, weakness and headaches.   Endo/Heme/Allergies:  Negative for environmental allergies.   Psychiatric/Behavioral:  Negative for depression.      /62   Pulse 74   Resp 16   Ht 1.676 m (5' 6\")   Wt 80.3 kg (177 lb)   SpO2 94%     Physical Exam:  Physical Exam  Constitutional:       General: She is not in acute distress.     Appearance: Normal appearance. She is well-developed and normal weight.   HENT:      Head: Normocephalic and atraumatic.      Right Ear: External ear normal.      Left Ear: External ear normal.      Nose: Nose normal. No congestion.      Mouth/Throat:      Mouth: Mucous membranes are moist.      Pharynx: Oropharynx is clear. No oropharyngeal exudate.   Eyes:      General: No scleral icterus.     Extraocular Movements: Extraocular movements intact.      Conjunctiva/sclera: Conjunctivae normal.      Pupils: Pupils are equal, round, and reactive to light.   Neck:      Vascular: No JVD.      Trachea: No tracheal deviation.   Cardiovascular:      Rate and Rhythm: Normal rate and regular rhythm.      Heart sounds: Normal heart sounds. No murmur heard.    No friction rub. No gallop.   Pulmonary:      Effort: Pulmonary effort is normal. No accessory muscle usage or respiratory distress.      Breath sounds: Normal breath sounds. No wheezing or rales.   Abdominal:      General: There is no distension.      Palpations: Abdomen is soft.      Tenderness: There is no abdominal tenderness.   Musculoskeletal:         General: No " tenderness or deformity. Normal range of motion.      Cervical back: Normal range of motion and neck supple.      Right lower leg: Edema present.      Left lower leg: Edema present.      Comments: 1+ b/l LE edema   Lymphadenopathy:      Cervical: No cervical adenopathy.   Skin:     General: Skin is warm and dry.      Findings: No rash.      Nails: There is no clubbing.   Neurological:      Mental Status: She is alert and oriented to person, place, and time.      Cranial Nerves: No cranial nerve deficit.      Gait: Gait normal.   Psychiatric:         Behavior: Behavior normal.       PFTs as reviewed by me personally: none    Imaging as reviewed by me personally: as per HPI    Assessment:  1. Respiratory failure with hypoxia, unspecified chronicity (HCC)            Plan:  Pt has normal RA oxygenation today with normal exam. She is currently is nursing facility with medical providers. Based on her lack of sxs, respiratory stability, I see no reason for further w/u. Oxygen use at night as needed per providers at her facility is appropriate and adequate. Pt and family are okay with f/u prn.   Return if symptoms worsen or fail to improve.

## 2023-11-29 ENCOUNTER — PATIENT MESSAGE (OUTPATIENT)
Dept: HEALTH INFORMATION MANAGEMENT | Facility: OTHER | Age: 83
End: 2023-11-29